# Patient Record
Sex: FEMALE | Race: WHITE | NOT HISPANIC OR LATINO | Employment: FULL TIME | URBAN - METROPOLITAN AREA
[De-identification: names, ages, dates, MRNs, and addresses within clinical notes are randomized per-mention and may not be internally consistent; named-entity substitution may affect disease eponyms.]

---

## 2017-04-02 ENCOUNTER — OFFICE VISIT - HEALTHEAST (OUTPATIENT)
Dept: FAMILY MEDICINE | Facility: CLINIC | Age: 57
End: 2017-04-02

## 2017-04-02 DIAGNOSIS — R68.89 INFLUENZA-LIKE SYMPTOMS: ICD-10-CM

## 2017-04-02 DIAGNOSIS — R05.9 COUGH: ICD-10-CM

## 2017-04-02 DIAGNOSIS — J02.9 SORE THROAT: ICD-10-CM

## 2017-04-02 DIAGNOSIS — J20.9 ACUTE BRONCHITIS: ICD-10-CM

## 2017-04-02 ASSESSMENT — MIFFLIN-ST. JEOR: SCORE: 1175.1

## 2017-04-04 ENCOUNTER — TRANSFERRED RECORDS (OUTPATIENT)
Dept: HEALTH INFORMATION MANAGEMENT | Facility: CLINIC | Age: 57
End: 2017-04-04

## 2017-04-04 ENCOUNTER — RECORDS - HEALTHEAST (OUTPATIENT)
Dept: LAB | Facility: CLINIC | Age: 57
End: 2017-04-04

## 2017-04-04 LAB
CHOLEST SERPL-MCNC: 253 MG/DL
FASTING STATUS PATIENT QL REPORTED: NO
HDLC SERPL-MCNC: 45 MG/DL
LDLC SERPL CALC-MCNC: 163 MG/DL
PAP-ABSTRACT: NORMAL
TRIGL SERPL-MCNC: 225 MG/DL

## 2017-04-11 LAB
BKR LAB AP ABNORMAL BLEEDING: NO
BKR LAB AP BIRTH CONTROL/HORMONES: NORMAL
BKR LAB AP CERVICAL APPEARANCE: NORMAL
BKR LAB AP GYN ADEQUACY: NORMAL
BKR LAB AP GYN INTERPRETATION: NORMAL
BKR LAB AP HPV REFLEX: NORMAL
BKR LAB AP LMP: NORMAL
BKR LAB AP PATIENT STATUS: NORMAL
BKR LAB AP PREVIOUS ABNORMAL: NORMAL
BKR LAB AP PREVIOUS NORMAL: 2013
HIGH RISK?: NO
PATH REPORT.COMMENTS IMP SPEC: NORMAL
RESULT FLAG (HE HISTORICAL CONVERSION): NORMAL

## 2017-09-14 ENCOUNTER — HOSPITAL ENCOUNTER (OUTPATIENT)
Dept: GENERAL RADIOLOGY | Facility: CLINIC | Age: 57
Discharge: HOME | End: 2017-09-14
Attending: INTERNAL MEDICINE | Admitting: INTERNAL MEDICINE

## 2017-09-14 DIAGNOSIS — R76.11 HISTORY OF MANTOUX POSITIVE, TREATMENT STATUS UNKNOWN: ICD-10-CM

## 2017-09-14 PROCEDURE — 40000293 XR CHEST 1 VIEW, EMPLOYEE HEALTH

## 2017-10-16 ENCOUNTER — COMMUNICATION - HEALTHEAST (OUTPATIENT)
Dept: FAMILY MEDICINE | Facility: CLINIC | Age: 57
End: 2017-10-16

## 2017-10-19 ENCOUNTER — OFFICE VISIT - HEALTHEAST (OUTPATIENT)
Dept: FAMILY MEDICINE | Facility: CLINIC | Age: 57
End: 2017-10-19

## 2017-10-19 DIAGNOSIS — H92.09 EAR PAIN: ICD-10-CM

## 2017-10-19 ASSESSMENT — MIFFLIN-ST. JEOR: SCORE: 1141.36

## 2018-03-17 ENCOUNTER — RECORDS - HEALTHEAST (OUTPATIENT)
Dept: ADMINISTRATIVE | Facility: OTHER | Age: 58
End: 2018-03-17

## 2018-04-05 ENCOUNTER — OFFICE VISIT (OUTPATIENT)
Dept: FAMILY MEDICINE | Facility: CLINIC | Age: 58
End: 2018-04-05
Payer: OTHER MISCELLANEOUS

## 2018-04-05 VITALS
DIASTOLIC BLOOD PRESSURE: 84 MMHG | RESPIRATION RATE: 14 BRPM | OXYGEN SATURATION: 98 % | WEIGHT: 140.6 LBS | SYSTOLIC BLOOD PRESSURE: 119 MMHG | HEART RATE: 69 BPM | HEIGHT: 61 IN | BODY MASS INDEX: 26.55 KG/M2 | TEMPERATURE: 97 F

## 2018-04-05 DIAGNOSIS — Z01.818 PREOP GENERAL PHYSICAL EXAM: Primary | ICD-10-CM

## 2018-04-05 DIAGNOSIS — G56.22 ENTRAPMENT OF LEFT ULNAR NERVE: ICD-10-CM

## 2018-04-05 DIAGNOSIS — Z12.31 VISIT FOR SCREENING MAMMOGRAM: ICD-10-CM

## 2018-04-05 LAB
BASOPHILS # BLD AUTO: 0 10E9/L (ref 0–0.2)
BASOPHILS NFR BLD AUTO: 0.6 %
DIFFERENTIAL METHOD BLD: NORMAL
EOSINOPHIL # BLD AUTO: 0.2 10E9/L (ref 0–0.7)
EOSINOPHIL NFR BLD AUTO: 3.1 %
ERYTHROCYTE [DISTWIDTH] IN BLOOD BY AUTOMATED COUNT: 14.1 % (ref 10–15)
HCT VFR BLD AUTO: 43.5 % (ref 35–47)
HGB BLD-MCNC: 14.2 G/DL (ref 11.7–15.7)
LYMPHOCYTES # BLD AUTO: 2.5 10E9/L (ref 0.8–5.3)
LYMPHOCYTES NFR BLD AUTO: 37.9 %
MCH RBC QN AUTO: 28.9 PG (ref 26.5–33)
MCHC RBC AUTO-ENTMCNC: 32.6 G/DL (ref 31.5–36.5)
MCV RBC AUTO: 89 FL (ref 78–100)
MONOCYTES # BLD AUTO: 0.5 10E9/L (ref 0–1.3)
MONOCYTES NFR BLD AUTO: 7.1 %
NEUTROPHILS # BLD AUTO: 3.3 10E9/L (ref 1.6–8.3)
NEUTROPHILS NFR BLD AUTO: 51.3 %
PLATELET # BLD AUTO: 209 10E9/L (ref 150–450)
RBC # BLD AUTO: 4.91 10E12/L (ref 3.8–5.2)
WBC # BLD AUTO: 6.5 10E9/L (ref 4–11)

## 2018-04-05 PROCEDURE — 85025 COMPLETE CBC W/AUTO DIFF WBC: CPT | Performed by: PHYSICIAN ASSISTANT

## 2018-04-05 PROCEDURE — 99214 OFFICE O/P EST MOD 30 MIN: CPT | Performed by: PHYSICIAN ASSISTANT

## 2018-04-05 PROCEDURE — 36415 COLL VENOUS BLD VENIPUNCTURE: CPT | Performed by: PHYSICIAN ASSISTANT

## 2018-04-05 RX ORDER — IBUPROFEN 600 MG/1
TABLET, FILM COATED ORAL
Refills: 0 | COMMUNITY
Start: 2018-01-26 | End: 2022-12-14

## 2018-04-05 NOTE — MR AVS SNAPSHOT
After Visit Summary   4/5/2018    Pretty Parker    MRN: [id number]           Patient Information     Date Of Birth          1960        Visit Information        Provider Department      4/5/2018 12:20 PM Yordy Ramos PA-C [facility] [place]        Today's Diagnoses     Preop general physical exam    -  1    Entrapment of left ulnar nerve        Visit for screening mammogram        Need for prophylactic vaccination with tetanus-diphtheria (TD)          Care Instructions      Before Your Surgery      Call your surgeon if there is any change in your health. This includes signs of a cold or flu (such as a sore throat, runny nose, cough, rash or fever).    Do not smoke, drink alcohol or take over the counter medicine (unless your surgeon or primary care doctor tells you to) for the 24 hours before and after surgery.    If you take prescribed drugs: Follow your doctor s orders about which medicines to take and which to stop until after surgery.    Eating and drinking prior to surgery: follow the instructions from your surgeon    Take a shower or bath the night before surgery. Use the soap your surgeon gave you to gently clean your skin. If you do not have soap from your surgeon, use your regular soap. Do not shave or scrub the surgery site.  Wear clean pajamas and have clean sheets on your bed.           Follow-ups after your visit        Follow-up notes from your care team     Return in about 1 year (around 4/5/2019) for Physical Exam.      Future tests that were ordered for you today     Open Future Orders        Priority Expected Expires Ordered    MA SCREENING DIGITAL BILAT - Future  (s+30) Routine  4/5/2019 4/5/2018            Who to contact     If you have questions or need follow up information about today's clinic visit or your schedule please contact [facility] [place] directly at [phone].  Normal or non-critical lab and imaging results will be communicated  "to you by TapImmunehart, letter or phone within 4 business days after the clinic has received the results. If you do not hear from us within 7 days, please contact the clinic through quietrevolution or phone. If you have a critical or abnormal lab result, we will notify you by phone as soon as possible.  Submit refill requests through quietrevolution or call your pharmacy and they will forward the refill request to us. Please allow 3 business days for your refill to be completed.          Additional Information About Your Visit        quietrevolution Information     quietrevolution lets you send messages to your doctor, view your test results, renew your prescriptions, schedule appointments and more. To sign up, go to [email or web address] . Click on \"Log in\" on the left side of the screen, which will take you to the Welcome page. Then click on \"Sign up Now\" on the right side of the page.     You will be asked to enter the access code listed below, as well as some personal information. Please follow the directions to create your username and password.     Your access code is: [email or web address]  Expires: 2018 12:49 PM     Your access code will  in 90 days. If you need help or a new code, please call your [place] clinic or [phone].        Care EveryWhere ID     This is your Care EveryWhere ID. This could be used by other organizations to access your [place] medical records  [id number]        Your Vitals Were     Pulse Temperature Respirations Height Pulse Oximetry BMI (Body Mass Index)    69 97  F (36.1  C) (Tympanic) 14 5' 0.5\" (1.537 m) 98% 27.01 kg/m2       Blood Pressure from Last 3 Encounters:   18 119/84   12 105/79    Weight from Last 3 Encounters:   18 140 lb 9.6 oz (63.8 kg)              We Performed the Following     CBC with platelets differential        Primary Care Provider Fax #    Provider Not In System [phone]                Equal Access to Services     WHITNEY CURTIS AH: Berry Kwan, " waashleykyle adamjoseha, niru kabony cornelius, tong bensonaadamon ah. So [facility] [phone].    ATENCIÓN: Si lorila nayla, tiene a baugh disposición servicios gratuitos de asistencia lingüística. Llame al [phone].    We comply with applicable federal civil rights laws and [place] laws. We do not discriminate on the basis of race, color, national origin, age, disability, sex, sexual orientation, or gender identity.            Thank you!     [facility]. Our goal is always to provide you with excellent care. Hearing back from our patients is one way we can continue to improve our services. Please take a few minutes to complete the written survey that you may receive in the mail after your visit with us. Thank you!             Your Updated Medication List - Protect others around you: Learn how to safely use, store and throw away your medicines at www.disposemymeds.org.          This list is accurate as of 4/5/18 12:50 PM.  Always use your most recent med list.                   Brand Name Dispense Instructions for use Diagnosis    ibuprofen 600 MG tablet    ADVIL/MOTRIN     TK 1 T PO TID PRN

## 2018-04-05 NOTE — LETTER
April 5, 2018      Pretty Parker  [address] [address]        Dear ,    We are writing to inform you of your test results.      -Normal red blood cell (hgb) levels, normal white blood cell count and normal platelet levels.    Resulted Orders   CBC with platelets differential   Result Value Ref Range    WBC 6.5 4.0 - 11.0 10e9/L    RBC Count 4.91 3.8 - 5.2 10e12/L    Hemoglobin 14.2 11.7 - 15.7 g/dL    Hematocrit 43.5 35.0 - 47.0 %    MCV 89 78 - 100 fl    MCH 28.9 26.5 - 33.0 pg    MCHC 32.6 31.5 - 36.5 g/dL    RDW 14.1 10.0 - 15.0 %    Platelet Count 209 150 - 450 10e9/L    Diff Method Automated Method     % Neutrophils 51.3 %    % Lymphocytes 37.9 %    % Monocytes 7.1 %    % Eosinophils 3.1 %    % Basophils 0.6 %    Absolute Neutrophil 3.3 1.6 - 8.3 10e9/L    Absolute Lymphocytes 2.5 0.8 - 5.3 10e9/L    Absolute Monocytes 0.5 0.0 - 1.3 10e9/L    Absolute Eosinophils 0.2 0.0 - 0.7 10e9/L    Absolute Basophils 0.0 0.0 - 0.2 10e9/L       If you have any questions or concerns, please call the clinic at the number listed above.       Sincerely,          Yordy Ramos PA-C

## 2018-04-05 NOTE — NURSING NOTE
"Chief Complaint   Patient presents with     Pre-Op Exam       Initial /84  Pulse 69  Temp 97  F (36.1  C) (Tympanic)  Resp 14  Ht 5' 0.5\" (1.537 m)  Wt 140 lb 9.6 oz (63.8 kg)  SpO2 98%  BMI 27.01 kg/m2 Estimated body mass index is 27.01 kg/(m^2) as calculated from the following:    Height as of this encounter: 5' 0.5\" (1.537 m).    Weight as of this encounter: 140 lb 9.6 oz (63.8 kg).  Medication Reconciliation: complete    Donna Shafer MA  "

## 2018-04-05 NOTE — Clinical Note
Please abstract the following data from this visit with this patient into the appropriate field in Epic:  Pap smear done on this date: 01/01/2017 (approximately), by this group: [facility], results were normal.   Colonoscopy done on this date: 01/01/2014 (approximately), by this group: [place], [place], results were normal.

## 2018-04-05 NOTE — PROGRESS NOTES
[facility]  [address] [address]  [phone]  Dept: [phone]    PRE-OP EVALUATION:  Today's date: 2018    Pretty Parker (: 1960) presents for pre-operative evaluation assessment as requested by Dr. Ozzie Cortes.  She requires evaluation and anesthesia risk assessment prior to undergoing surgery/procedure for treatment of left elbow debridement and reconstruction, ulnar nerve decompression.     Proposed Surgery/ Procedure: Left elbow medial epicondylar debridement reconstruction  Date of Surgery/ Procedure: 2018  Time of Surgery/ Procedure: TBD - Afternoon  Hospital/Surgical Facility: [facility]  Fax number for surgical facility: [phone], [phone]  Primary Physician: Adelaide Samano  Type of Anesthesia Anticipated: General    Patient has a Health Care Directive or Living Will:  NO    1. NO - Do you have a history of heart attack, stroke, stent, bypass or surgery on an artery in the head, neck, heart or legs?  2. NO - Do you ever have any pain or discomfort in your chest?  3. NO - Do you have a history of  Heart Failure?  4. NO - Are you troubled by shortness of breath when: walking on the level, up a slight hill or at night?  5. NO - Do you currently have a cold, bronchitis or other respiratory infection?  6. NO - Do you have a cough, shortness of breath or wheezing?  7. NO - Do you sometimes get pains in the calves of your legs when you walk?  8. NO - Do you or anyone in your family have previous history of blood clots?  9. NO - Do you or does anyone in your family have a serious bleeding problem such as prolonged bleeding following surgeries or cuts?  10. NO - Have you ever had problems with anemia or been told to take iron pills?  11. NO - Have you had any abnormal blood loss such as black, tarry or bloody stools, or abnormal vaginal bleeding?  12. NO - Have you ever had a blood transfusion?  13. NO - Have  you or any of your relatives ever had problems with anesthesia?  14. NO - Do you have sleep apnea, excessive snoring or daytime drowsiness?  15. NO - Do you have any prosthetic heart valves?  16. NO - Do you have prosthetic joints?  17. NO - Is there any chance that you may be pregnant?      HPI:     HPI related to upcoming procedure: Pretty is new to the clinic today, She presents for a preoperative examination prior to left epicondyl reconstruction and debridement and ulnar decompression for nerve compression. No complaints today      See problem list for active medical problems.  Problems all longstanding and stable, except as noted/documented.  See ROS for pertinent symptoms related to these conditions.                                                                                                                                                          .    MEDICAL HISTORY:     Patient Active Problem List    Diagnosis Date Noted     Depression 08/28/2012     Priority: Medium      History reviewed. No pertinent past medical history.  Past Surgical History:   Procedure Laterality Date     low back surgery       ulnar nerve decompression       Current Outpatient Prescriptions   Medication Sig Dispense Refill     ibuprofen (ADVIL/MOTRIN) 600 MG tablet TK 1 T PO TID PRN  0     OTC products: NSAIDS    Allergies   Allergen Reactions     Aspirin Rash     Penicillins       Latex Allergy: NO    Social History   Substance Use Topics     Smoking status: Never Smoker     Smokeless tobacco: Never Used     Alcohol use Yes      Comment: socially     History   Drug Use No       REVIEW OF SYSTEMS:   CONSTITUTIONAL: NEGATIVE for fever, chills, change in weight  INTEGUMENTARY/SKIN: NEGATIVE for worrisome rashes, moles or lesions  EYES: NEGATIVE for vision changes or irritation  ENT/MOUTH: NEGATIVE for ear, mouth and throat problems  RESP: NEGATIVE for significant cough or SOB  BREAST: NEGATIVE for masses, tenderness or  "discharge  CV: NEGATIVE for chest pain, palpitations or peripheral edema  GI: NEGATIVE for nausea, abdominal pain, heartburn, or change in bowel habits  MUSCULOSKELETAL: NEGATIVE for significant arthralgias or myalgia  NEURO: NEGATIVE for weakness, dizziness or paresthesias  ENDOCRINE: NEGATIVE for temperature intolerance, skin/hair changes  HEME: NEGATIVE for bleeding problems  PSYCHIATRIC: NEGATIVE for changes in mood or affect    EXAM:   /84  Pulse 69  Temp 97  F (36.1  C) (Tympanic)  Resp 14  Ht 5' 0.5\" (1.537 m)  Wt 140 lb 9.6 oz (63.8 kg)  SpO2 98%  BMI 27.01 kg/m2    GENERAL APPEARANCE: healthy, alert and no distress     EYES: EOMI, PERRL     HENT: ear canals and TM's normal and nose and mouth without ulcers or lesions     NECK: no adenopathy, no asymmetry, masses, or scars and thyroid normal to palpation     RESP: lungs clear to auscultation - no rales, rhonchi or wheezes     CV: regular rates and rhythm, normal S1 S2, no S3 or S4 and no murmur, click or rub     ABDOMEN:  soft, nontender, no HSM or masses and bowel sounds normal     MS: extremities normal- no gross deformities noted, no evidence of inflammation in joints, FROM in all extremities.     SKIN: no suspicious lesions or rashes     NEURO: Normal strength and tone, sensory exam grossly normal, mentation intact and speech normal     PSYCH: mentation appears normal. and affect normal/bright    DIAGNOSTICS:   Hemoglobin (indicated for history of anemia or procedure with significant blood loss such as tonsillectomy, major intraperitoneal surgery, vascular surgery, major spine surgery, total joint replacement)    Recent Labs   Lab Test  08/29/12   0734  08/28/12   0300   HGB  13.8   --    PLT  190   --    NA   --   141   POTASSIUM   --   3.7   CR   --   0.48*      Results for orders placed or performed in visit on 04/05/18   CBC with platelets differential   Result Value Ref Range    WBC 6.5 4.0 - 11.0 10e9/L    RBC Count 4.91 3.8 - 5.2 " 10e12/L    Hemoglobin 14.2 11.7 - 15.7 g/dL    Hematocrit 43.5 35.0 - 47.0 %    MCV 89 78 - 100 fl    MCH 28.9 26.5 - 33.0 pg    MCHC 32.6 31.5 - 36.5 g/dL    RDW 14.1 10.0 - 15.0 %    Platelet Count 209 150 - 450 10e9/L    Diff Method Automated Method     % Neutrophils 51.3 %    % Lymphocytes 37.9 %    % Monocytes 7.1 %    % Eosinophils 3.1 %    % Basophils 0.6 %    Absolute Neutrophil 3.3 1.6 - 8.3 10e9/L    Absolute Lymphocytes 2.5 0.8 - 5.3 10e9/L    Absolute Monocytes 0.5 0.0 - 1.3 10e9/L    Absolute Eosinophils 0.2 0.0 - 0.7 10e9/L    Absolute Basophils 0.0 0.0 - 0.2 10e9/L       EKG not indicated today  IMPRESSION:   Reason for surgery/procedure: left elbow reconstruction and ulnar nerve decompression  Diagnosis/reason for consult: preoperative examination    The proposed surgical procedure is considered INTERMEDIATE risk.    REVISED CARDIAC RISK INDEX  The patient has the following serious cardiovascular risks for perioperative complications such as (MI, PE, VFib and 3  AV Block):  No serious cardiac risks  INTERPRETATION: 0 risks: Class I (very low risk - 0.4% complication rate)    The patient has the following additional risks for perioperative complications:  No identified additional risks    1. Preop general physical exam  - CBC with platelets differential    2. Entrapment of left ulnar nerve    3. Visit for screening mammogram  - MA SCREENING DIGITAL BILAT - Future  (s+30); Future        RECOMMENDATIONS:             APPROVAL GIVEN to proceed with proposed procedure, without further diagnostic evaluation       Signed Electronically by: Yordy Ramos PA-C    Copy of this evaluation report is provided to requesting physician.    [facility] Preop Guidelines    Revised Cardiac Risk Index    Addendum:  I have reviewed the above document, agree with the assessment and plan as outlined.  Optimized for the planned procedure  Apolinar Connolly MD  4/9/2018

## 2018-12-24 ENCOUNTER — OFFICE VISIT (OUTPATIENT)
Dept: FAMILY MEDICINE | Facility: CLINIC | Age: 58
End: 2018-12-24
Payer: COMMERCIAL

## 2018-12-24 VITALS
HEIGHT: 61 IN | WEIGHT: 138 LBS | TEMPERATURE: 97.2 F | BODY MASS INDEX: 26.06 KG/M2 | RESPIRATION RATE: 14 BRPM | HEART RATE: 76 BPM | SYSTOLIC BLOOD PRESSURE: 129 MMHG | OXYGEN SATURATION: 98 % | DIASTOLIC BLOOD PRESSURE: 76 MMHG

## 2018-12-24 DIAGNOSIS — R13.10 DYSPHAGIA, UNSPECIFIED TYPE: Primary | ICD-10-CM

## 2018-12-24 PROCEDURE — 99213 OFFICE O/P EST LOW 20 MIN: CPT | Performed by: PHYSICIAN ASSISTANT

## 2018-12-24 ASSESSMENT — MIFFLIN-ST. JEOR: SCORE: 1145.4

## 2018-12-24 NOTE — PROGRESS NOTES
"  SUBJECTIVE:   Pretty Parker is a 58 year old female who presents to clinic today for the following health issues:      Throat Problem       Duration: 4 years on and off     Description (location/character/radiation): difficulty swallowing     Intensity:  moderate    Accompanying signs and symptoms: foul smell when taking deep breaths     History (similar episodes/previous evaluation): Endoscopy, diagnosed with GERD    Precipitating or alleviating factors: None    Therapies tried and outcome: Silvia Olsen presents to the clinic concerned about a 4 year hx of difficulty swallowing.  She describes this as feeling like something is \" getting stuck\" in her throat when she swallows.  This seems to happen with liquids and solids.She is not having pain when she swallows.  She had an endoscopy in the past and was diagnosed with GERD but feels like these symptoms are different.     Problem list and histories reviewed & adjusted, as indicated.  Additional history: as documented    Patient Active Problem List   Diagnosis     Depression     Past Surgical History:   Procedure Laterality Date     low back surgery       ulnar nerve decompression         Social History     Tobacco Use     Smoking status: Never Smoker     Smokeless tobacco: Never Used   Substance Use Topics     Alcohol use: Yes     Comment: socially     Family History   Problem Relation Age of Onset     Coronary Artery Disease Maternal Grandmother 69         Current Outpatient Medications   Medication Sig Dispense Refill     ibuprofen (ADVIL/MOTRIN) 600 MG tablet TK 1 T PO TID PRN  0     ranitidine (ZANTAC) 75 MG tablet Take 150 mg by mouth daily       UNABLE TO FIND daily MEDICATION NAME: Cold- eeze       UNABLE TO FIND daily MEDICATION NAME: Sever cold & flu       Allergies   Allergen Reactions     Aspirin Rash     Penicillins        Reviewed and updated as needed this visit by clinical staff       Reviewed and updated as needed this visit by Provider     " "    ROS:  Constitutional, HEENT, cardiovascular, pulmonary, gi and gu systems are negative, except as otherwise noted.    OBJECTIVE:     /76   Pulse 76   Temp 97.2  F (36.2  C) (Tympanic)   Resp 14   Ht 1.537 m (5' 0.5\")   Wt 62.6 kg (138 lb)   SpO2 98%   BMI 26.51 kg/m    Body mass index is 26.51 kg/m .  GENERAL: healthy, alert and no distress  HENT: ear canals and TM's normal, nose and mouth without ulcers or lesions  NECK: no adenopathy, no asymmetry, masses, or scars and thyroid normal to palpation  RESP: lungs clear to auscultation - no rales, rhonchi or wheezes  CV: regular rate and rhythm, normal S1 S2, no S3 or S4, no murmur, click or rub, no peripheral edema and peripheral pulses strong  ABDOMEN: soft, nontender, no hepatosplenomegaly, no masses and bowel sounds normal    Diagnostic Test Results:  none     ASSESSMENT/PLAN:       1. Dysphagia, unspecified type  Worsening dysphagia over the past 4 years, will start with endoscopy for further assessment.  If normal, may suggest ENT referral  - GASTROENTEROLOGY ADULT REF PROCEDURE ONLY Efren Meza [phone]; No Provider Preference        See Patient Instructions    Yordy Ramos PA-C  [facility]    "

## 2019-01-24 ENCOUNTER — HOSPITAL ENCOUNTER (OUTPATIENT)
Facility: CLINIC | Age: 59
Discharge: HOME | End: 2019-01-24
Attending: SPECIALIST | Admitting: SPECIALIST
Payer: COMMERCIAL

## 2019-01-24 ENCOUNTER — TRANSFERRED RECORDS (OUTPATIENT)
Dept: HEALTH INFORMATION MANAGEMENT | Facility: CLINIC | Age: 59
End: 2019-01-24

## 2019-01-24 VITALS
OXYGEN SATURATION: 98 % | DIASTOLIC BLOOD PRESSURE: 87 MMHG | HEIGHT: 61 IN | RESPIRATION RATE: 23 BRPM | SYSTOLIC BLOOD PRESSURE: 150 MMHG | WEIGHT: 138 LBS | BODY MASS INDEX: 26.06 KG/M2 | HEART RATE: 107 BPM

## 2019-01-24 DIAGNOSIS — K21.00 GASTROESOPHAGEAL REFLUX DISEASE WITH ESOPHAGITIS: Primary | ICD-10-CM

## 2019-01-24 DIAGNOSIS — R13.12 OROPHARYNGEAL DYSPHAGIA: ICD-10-CM

## 2019-01-24 LAB — UPPER GI ENDOSCOPY: NORMAL

## 2019-01-24 PROCEDURE — 25000125 ZZHC RX 250: Performed by: SPECIALIST

## 2019-01-24 PROCEDURE — 88305 TISSUE EXAM BY PATHOLOGIST: CPT | Mod: 26 | Performed by: SPECIALIST

## 2019-01-24 PROCEDURE — 43248 EGD GUIDE WIRE INSERTION: CPT | Performed by: SPECIALIST

## 2019-01-24 PROCEDURE — 25000128 H RX IP 250 OP 636: Performed by: SPECIALIST

## 2019-01-24 PROCEDURE — 88342 IMHCHEM/IMCYTCHM 1ST ANTB: CPT | Performed by: SPECIALIST

## 2019-01-24 PROCEDURE — G0500 MOD SEDAT ENDO SERVICE >5YRS: HCPCS | Performed by: SPECIALIST

## 2019-01-24 PROCEDURE — 43239 EGD BIOPSY SINGLE/MULTIPLE: CPT | Performed by: SPECIALIST

## 2019-01-24 PROCEDURE — 88305 TISSUE EXAM BY PATHOLOGIST: CPT | Performed by: SPECIALIST

## 2019-01-24 PROCEDURE — 88342 IMHCHEM/IMCYTCHM 1ST ANTB: CPT | Mod: 26 | Performed by: SPECIALIST

## 2019-01-24 RX ORDER — FENTANYL CITRATE 50 UG/ML
INJECTION, SOLUTION INTRAMUSCULAR; INTRAVENOUS PRN
Status: DISCONTINUED | OUTPATIENT
Start: 2019-01-24 | End: 2019-01-24 | Stop reason: HOSPADM

## 2019-01-24 RX ORDER — ONDANSETRON 2 MG/ML
4 INJECTION INTRAMUSCULAR; INTRAVENOUS
Status: DISCONTINUED | OUTPATIENT
Start: 2019-01-24 | End: 2019-01-24 | Stop reason: HOSPADM

## 2019-01-24 RX ORDER — LIDOCAINE 40 MG/G
CREAM TOPICAL
Status: DISCONTINUED | OUTPATIENT
Start: 2019-01-24 | End: 2019-01-24 | Stop reason: HOSPADM

## 2019-01-24 ASSESSMENT — MIFFLIN-ST. JEOR: SCORE: 1157.3

## 2019-01-24 NOTE — H&P
Pre-Endoscopy History and Physical     Pretty Parker MRN# [id number]   YOB: 1960 Age: 58 year old     Date of Procedure: 1/24/2019  Primary care provider: No Ref-Primary, Physician  Type of Endoscopy: Gastroscopy with possible biopsy, possible dilation  Reason for Procedure: dysphagia  Type of Anesthesia Anticipated: Conscious Sedation    HPI:    Pretty is a 58 year old female who will be undergoing the above procedure.      A history and physical has been performed. The patient's medications and allergies have been reviewed. The risks and benefits of the procedure and the sedation options and risks were discussed with the patient.  All questions were answered and informed consent was obtained.      She denies a personal or family history of anesthesia complications or bleeding disorders.     Patient Active Problem List   Diagnosis     Depression        History reviewed. No pertinent past medical history.     Past Surgical History:   Procedure Laterality Date     APPENDECTOMY       low back surgery       ulnar nerve decompression         Social History     Tobacco Use     Smoking status: Never Smoker     Smokeless tobacco: Never Used   Substance Use Topics     Alcohol use: Yes     Comment: socially       Family History   Problem Relation Age of Onset     Coronary Artery Disease Maternal Grandmother 69       Prior to Admission medications    Medication Sig Start Date End Date Taking? Authorizing Provider   ibuprofen (ADVIL/MOTRIN) 600 MG tablet TK 1 T PO TID PRN 1/26/18  Yes Reported, Patient   ranitidine (ZANTAC) 75 MG tablet Take 150 mg by mouth daily   Yes Reported, Patient   UNABLE TO FIND daily MEDICATION NAME: Cold- eeze    Reported, Patient   UNABLE TO FIND daily MEDICATION NAME: Sever cold & flu    Reported, Patient       Allergies   Allergen Reactions     Aspirin Rash     Penicillins      Sulfa Drugs Rash        REVIEW OF SYSTEMS:   5 point ROS negative except as noted above in HPI, including  "Gen., Resp., CV, GI &  system review.    PHYSICAL EXAM:   /80   Pulse 75   Ht 1.556 m (5' 1.25\")   Wt 62.6 kg (138 lb)   SpO2 96%   BMI 25.86 kg/m   Estimated body mass index is 25.86 kg/m  as calculated from the following:    Height as of this encounter: 1.556 m (5' 1.25\").    Weight as of this encounter: 62.6 kg (138 lb).   GENERAL APPEARANCE: alert, and oriented  MENTAL STATUS: alert  AIRWAY EXAM: Mallampatti Class I (visualization of the soft palate, fauces, uvula, anterior and posterior pillars)  RESP: lungs clear to auscultation - no rales, rhonchi or wheezes  CV: regular rates and rhythm  DIAGNOSTICS:    Not indicated    IMPRESSION   ASA Class 1 - Healthy patient, no medical problems    PLAN:   Plan for Gastroscopy with possible biopsy, possible dilation. We discussed the risks, benefits and alternatives and the patient wished to proceed.    The above has been forwarded to the consulting provider.      Signed Electronically by: Manolo Solares  January 24, 2019          "

## 2019-01-31 LAB — COPATH REPORT: NORMAL

## 2019-03-17 ENCOUNTER — OFFICE VISIT (OUTPATIENT)
Dept: URGENT CARE | Facility: URGENT CARE | Age: 59
End: 2019-03-17
Payer: COMMERCIAL

## 2019-03-17 VITALS
OXYGEN SATURATION: 96 % | HEART RATE: 102 BPM | DIASTOLIC BLOOD PRESSURE: 80 MMHG | WEIGHT: 142.9 LBS | BODY MASS INDEX: 26.78 KG/M2 | SYSTOLIC BLOOD PRESSURE: 118 MMHG | TEMPERATURE: 99 F | RESPIRATION RATE: 16 BRPM

## 2019-03-17 DIAGNOSIS — R05.9 COUGH: Primary | ICD-10-CM

## 2019-03-17 DIAGNOSIS — R09.89 CHEST CONGESTION: ICD-10-CM

## 2019-03-17 DIAGNOSIS — R09.81 NASAL CONGESTION: ICD-10-CM

## 2019-03-17 PROCEDURE — 99214 OFFICE O/P EST MOD 30 MIN: CPT | Performed by: PHYSICIAN ASSISTANT

## 2019-03-17 RX ORDER — AZITHROMYCIN 250 MG/1
TABLET, FILM COATED ORAL
Qty: 6 TABLET | Refills: 0 | Status: SHIPPED | OUTPATIENT
Start: 2019-03-17 | End: 2022-12-14

## 2019-03-17 RX ORDER — CODEINE PHOSPHATE AND GUAIFENESIN 10; 100 MG/5ML; MG/5ML
5-10 SOLUTION ORAL
Qty: 120 ML | Refills: 0 | Status: SHIPPED | OUTPATIENT
Start: 2019-03-17 | End: 2022-12-14

## 2019-03-17 RX ORDER — BENZONATATE 200 MG/1
200 CAPSULE ORAL 3 TIMES DAILY PRN
Qty: 21 CAPSULE | Refills: 0 | Status: SHIPPED | OUTPATIENT
Start: 2019-03-17 | End: 2019-03-24

## 2019-03-17 NOTE — PROGRESS NOTES
SUBJECTIVE:   Pretty Parker is a 58 year old female presenting with a chief complaint of coughing, congestion.  Onset of symptoms was 3 day(s) ago.  Course of illness is same.    Severity mild  Current and Associated symptoms: stuffy nose and cough - non-productive  Treatment measures tried include Fluids and Rest.  Predisposing factors include none.    PMH  Dysphagia       Allergies   Allergen Reactions     Aspirin Rash     Penicillins      Sulfa Drugs Rash     Family Hx  HTN  Anxiety  Depression    Social History     Tobacco Use     Smoking status: Never Smoker     Smokeless tobacco: Never Used   Substance Use Topics     Alcohol use: Yes     Comment: socially       ROS:  CONSTITUTIONAL:NEGATIVE for fever, chills, change in weight  INTEGUMENTARY/SKIN: NEGATIVE for worrisome rashes, moles or lesions  EYES: NEGATIVE for vision changes or irritation  ENT/MOUTH: POSITIVE for nasal congestion  RESP:POSITIVE for cough-non productive  CV: NEGATIVE for chest pain, palpitations or peripheral edema  GI: NEGATIVE for nausea, abdominal pain, heartburn, or change in bowel habits  : negative for and dysuria  MUSCULOSKELETAL: NEGATIVE for significant arthralgias or myalgia  NEURO: NEGATIVE for weakness, dizziness or paresthesias    OBJECTIVE  :/80   Pulse 102   Temp 99  F (37.2  C) (Oral)   Resp 16   Wt 64.8 kg (142 lb 14.4 oz)   SpO2 96%   BMI 26.78 kg/m    GENERAL APPEARANCE: healthy, alert and no distress  EYES: EOMI,  PERRL, conjunctiva clear  HENT: ear canals and TM's normal.  Nose and mouth without ulcers, erythema or lesions  NECK: supple, nontender, no lymphadenopathy  RESP: lungs clear to auscultation - no rales, rhonchi or wheezes  CV: regular rates and rhythm, normal S1 S2, no murmur noted  NEURO: Normal strength and tone, sensory exam grossly normal,  normal speech and mentation  SKIN: no suspicious lesions or rashes    ASSESSMENT/PLAN    ICD-10-CM    1. Cough R05 benzonatate (TESSALON) 200 MG capsule      guaiFENesin-codeine (ROBITUSSIN AC) 100-10 MG/5ML solution   2. Chest congestion R09.89 benzonatate (TESSALON) 200 MG capsule     azithromycin (ZITHROMAX) 250 MG tablet   3. Nasal congestion R09.81 azithromycin (ZITHROMAX) 250 MG tablet       Orders Placed This Encounter     benzonatate (TESSALON) 200 MG capsule     guaiFENesin-codeine (ROBITUSSIN AC) 100-10 MG/5ML solution     azithromycin (ZITHROMAX) 250 MG tablet       Patient works in pharmacy  zpak for if symptoms of coughing, congestion worsen  Fluids, rest  Follow up with PCP as needed  See orders in Epic

## 2019-03-17 NOTE — LETTER
[facility]  [address] [address]  [phone]      March 17, 2019    RE:  Pretty Parker                                                                                                                                                       [address] [facility] [address]            To whom it may concern:    Pretty Parker was seen in the urgent care today for a cold.  She may return to work.        Sincerely,        [facility]

## 2019-04-29 ENCOUNTER — OFFICE VISIT (OUTPATIENT)
Dept: FAMILY MEDICINE | Facility: CLINIC | Age: 59
End: 2019-04-29
Payer: COMMERCIAL

## 2019-04-29 VITALS — SYSTOLIC BLOOD PRESSURE: 114 MMHG | DIASTOLIC BLOOD PRESSURE: 74 MMHG

## 2019-04-29 DIAGNOSIS — L72.0 EPIDERMOID CYST OF SKIN OF SHOULDER: Primary | ICD-10-CM

## 2019-04-29 PROCEDURE — 99213 OFFICE O/P EST LOW 20 MIN: CPT | Performed by: FAMILY MEDICINE

## 2019-04-29 NOTE — LETTER
4/29/2019         RE: Pretty Parker  [address] [facility] [address]        Dear Colleague,    Thank you for referring your patient, Pretty Parker, to the [facility]. Please see a copy of my visit note below.    [facility] - PRIMARY CARE SKIN    CC: cyst   SUBJECTIVE:   Pretty Parker is a(n) 58 year old female who presents to clinic today because of a cyst on the left posterior shoulder.    Enlarging: No.  Tenderness: Yes.  Drainage: No.    Personal Medical History  Skin Cancer: NO  Eczema Psoriasis Autoimmune   NO NO NO     Family Medical History  Skin Cancer: NO  Eczema Psoriasis Autoimmune   NO NO NO     Occupation: pharmacy tech (indoor).    Refer to electronic medical record (EMR) for past medical history and medications.    INTEGUMENTARY/SKIN: POSITIVE for lumps or bumps  ROS: 14 point review of systems was negative except the symptoms listed above in the HPI.    This document serves as a record of the services and decisions personally performed and made by Franny North MD and was created by Chevy Mcdonald, a trained medical scribe, based on personal observations and provider statements to the medical scribe.  April 29, 2019 2:56 PM   Chevy Mcdonald    OBJECTIVE:   GENERAL: healthy, alert and no distress.  SKIN: Jimenez Skin Type - III.  Trunk, Arms were examined. The dermatoscope was used to help evaluate pigmented lesions.  Skin Pertinent Findings:  Left posterior shoulder: 6 mm in size freely mobile subepidermal mass most consistent with epidermoid cyst.    ASSESSMENT:     Encounter Diagnosis   Name Primary?     Epidermoid cyst of skin of shoulder Yes     MDM:   Discussion regarding treatment options for epidermoid cysts, including observation and excision.   Discussed risks of the excision procedure, including infection and scarring. It was explained that the cyst can reoccur, especially if it has become inflamed or infected prior to removal.    PLAN:   Patient  Instructions   FUTURE APPOINTMENTS  Follow up for a 40 minute appointment for excision of the cyst.    Avoid poking at, popping, or otherwise trying to manipulate the cyst.  Cyst excision would require activity modification for 2 weeks after the procedure.    TT: 20 minutes  CT: 15 minutes    The information in this document, created by the medical scribe for me, accurately reflects the services I personally performed and the decisions made by me. I have reviewed and approved this document for accuracy prior to leaving the patient care area.  April 29, 2019 2:56 PM  Franny North MD  [facility]    Again, thank you for allowing me to participate in the care of your patient.        Sincerely,        Franny North MD

## 2019-04-29 NOTE — PROGRESS NOTES
[facility] - PRIMARY CARE SKIN    CC: cyst   SUBJECTIVE:   Pretty Parker is a(n) 58 year old female who presents to clinic today because of a cyst on the left posterior shoulder.    Enlarging: No.  Tenderness: Yes.  Drainage: No.    Personal Medical History  Skin Cancer: NO  Eczema Psoriasis Autoimmune   NO NO NO     Family Medical History  Skin Cancer: NO  Eczema Psoriasis Autoimmune   NO NO NO     Occupation: pharmacy tech (indoor).    Refer to electronic medical record (EMR) for past medical history and medications.    INTEGUMENTARY/SKIN: POSITIVE for lumps or bumps  ROS: 14 point review of systems was negative except the symptoms listed above in the HPI.    This document serves as a record of the services and decisions personally performed and made by Franny North MD and was created by Chevy Mcdonald, a trained medical scribe, based on personal observations and provider statements to the medical scribe.  April 29, 2019 2:56 PM   Chevy Mcdonald    OBJECTIVE:   GENERAL: healthy, alert and no distress.  SKIN: Jimenez Skin Type - III.  Trunk, Arms were examined. The dermatoscope was used to help evaluate pigmented lesions.  Skin Pertinent Findings:  Left posterior shoulder: 6 mm in size freely mobile subepidermal mass most consistent with epidermoid cyst.    ASSESSMENT:     Encounter Diagnosis   Name Primary?     Epidermoid cyst of skin of shoulder Yes     MDM:   Discussion regarding treatment options for epidermoid cysts, including observation and excision.   Discussed risks of the excision procedure, including infection and scarring. It was explained that the cyst can reoccur, especially if it has become inflamed or infected prior to removal.    PLAN:   Patient Instructions   FUTURE APPOINTMENTS  Follow up for a 40 minute appointment for excision of the cyst.    Avoid poking at, popping, or otherwise trying to manipulate the cyst.  Cyst excision would require activity modification for 2 weeks after the  procedure.    TT: 20 minutes  CT: 15 minutes    The information in this document, created by the medical scribe for me, accurately reflects the services I personally performed and the decisions made by me. I have reviewed and approved this document for accuracy prior to leaving the patient care area.  April 29, 2019 2:56 PM  Franny North MD  [facility]

## 2019-04-29 NOTE — PATIENT INSTRUCTIONS
FUTURE APPOINTMENTS  Follow up for a 40 minute appointment for excision of the cyst.    Avoid poking at, popping, or otherwise trying to manipulate the cyst.  Cyst excision would require activity modification for 2 weeks after the procedure.

## 2019-05-09 ENCOUNTER — OFFICE VISIT (OUTPATIENT)
Dept: FAMILY MEDICINE | Facility: CLINIC | Age: 59
End: 2019-05-09
Payer: COMMERCIAL

## 2019-05-09 VITALS — SYSTOLIC BLOOD PRESSURE: 114 MMHG | DIASTOLIC BLOOD PRESSURE: 72 MMHG

## 2019-05-09 DIAGNOSIS — L72.0 EPIDERMOID CYST OF SKIN OF SHOULDER: Primary | ICD-10-CM

## 2019-05-09 PROCEDURE — 11401 EXC TR-EXT B9+MARG 0.6-1 CM: CPT | Mod: 51 | Performed by: FAMILY MEDICINE

## 2019-05-09 PROCEDURE — 12032 INTMD RPR S/A/T/EXT 2.6-7.5: CPT | Performed by: FAMILY MEDICINE

## 2019-05-09 PROCEDURE — 88305 TISSUE EXAM BY PATHOLOGIST: CPT | Mod: TC | Performed by: FAMILY MEDICINE

## 2019-05-09 NOTE — PATIENT INSTRUCTIONS
"FUTURE APPOINTMENTS  Follow up in 7 days for bandage change with the nurse.    If any problems with wound healing:  call directly back to clinic RN at [phone]    BANDAGE CARE INSTRUCTIONS    Keep dressing completely dry.    Make sure to avoid soaking the procedure area. Cover with Tegaderm or plastic wrap when showering.    Limit use of the area where the procedure was done for a few days to allow for optimal healing.    If you experience bleeding:  Wash hands and hold firm pressure on the area for 10 minutes without checking to see if the bleeding has stopped. \"Checking\" pulls off the protective wound clot and restarts the bleeding all over again. Re-apply pressure for 10 minutes if necessary to stop bleeding.  Use additional sterile gauze and tape to maintain pressure once bleeding has stopped.  If bleeding continues, then call back to clinic at [phone].    Signs of Infection:  Infection can occur in any area where skin has been disrupted.  If you notice persistent redness, swelling, colored drainage, increasing pain, fever or other signs of infection, please call us at: [phone] and ask to have me or my colleague paged. We will call you back to discuss.    Pathology Results:  You will be notified, generally via letter or MyChart, in approximately 10 days. If there is anything we need to discuss or further treatment needed, I will call you to discuss it.    PAIN CONTROL INSTRUCTIONS    First, try either acetaminophen (Tylenol), or NSAID ibuprofen (Advil, Markus, etc), or NSAID naproxen (Aleve, Naprosyn)    Acetaminophen dosage : one 325-500 mg tablet taken every 4-6 hours with a maximum of 4000 mg/day = 4 g/day    Ibuprofen dosage : one 600 mg tablet taken every 4-6 hours with a maximum of 4-6 tablets/day    Naproxen dosage : one 500 mg tablet taken twice daily with a maximum of 2 tablets/day     Second, try combining acetaminophen and an NSAID - often the combination will work better then " either one alone.

## 2019-05-09 NOTE — LETTER
5/9/2019         RE: Pretty Parker  [address] [facility] [address]        Dear Colleague,    Thank you for referring your patient, Pretty Parker, to the [facility]. Please see a copy of my visit note below.    [facility] - PRIMARY CARE SKIN    CC: cyst   SUBJECTIVE:   Pretty Parker is a(n) 58 year old female who presents to clinic today for excision of a cyst on the left posterior shoulder.    Enlarging: No. It has diminished in size, but it will enlarge if she touches it.  Tenderness: Yes.  Drainage: No.    Personal Medical History  Skin Cancer: NO  Eczema Psoriasis Autoimmune   NO NO NO     Family Medical History  Skin Cancer: NO  Eczema Psoriasis Autoimmune   NO NO NO     Occupation: pharmacy tech (indoor).    Refer to electronic medical record (EMR) for past medical history and medications.    INTEGUMENTARY/SKIN: POSITIVE for lumps or bumps  ROS: 14 point review of systems was negative except the symptoms listed above in the HPI.    This document serves as a record of the services and decisions personally performed and made by Franny North MD and was created by Chevy Mcdonald, a trained medical scribe, based on personal observations and provider statements to the medical scribe.  May 9, 2019 7:00 AM   Chevy Mcdonald    OBJECTIVE:   GENERAL: healthy, alert and no distress.  SKIN: Jimenez Skin Type - III.  Trunk, Arms were examined. The dermatoscope was used to help evaluate pigmented lesions.  Skin Pertinent Findings:  Left posterior shoulder: 6 mm in size freely mobile subepidermal mass most consistent with epidermoid cyst.    Left lower posterior shoulder- 4 mm erythematous subepidermal mass with overlying violaceous hue. This was not the original cyst she came in with. I contacted her and explained this is the one that was removed. The cyst higher up can be addressed next week, since that one has been bothering her    ASSESSMENT:     Encounter Diagnosis   Name  "Primary?     Epidermoid cyst of skin of shoulder Yes     MDM:   Discussion regarding treatment options for epidermoid cysts, including observation and excision.   Discussed risks of the excision procedure, including infection and scarring. It was explained that the cyst can reoccur, especially if it has become inflamed or infected prior to removal.    PLAN:   Patient Instructions   FUTURE APPOINTMENTS  Follow up in 7 days for bandage change with the nurse.    If any problems with wound healing:  call directly back to clinic RN at [phone]    BANDAGE CARE INSTRUCTIONS    Keep dressing completely dry.    Make sure to avoid soaking the procedure area. Cover with Tegaderm or plastic wrap when showering.    Limit use of the area where the procedure was done for a few days to allow for optimal healing.    If you experience bleeding:  Wash hands and hold firm pressure on the area for 10 minutes without checking to see if the bleeding has stopped. \"Checking\" pulls off the protective wound clot and restarts the bleeding all over again. Re-apply pressure for 10 minutes if necessary to stop bleeding.  Use additional sterile gauze and tape to maintain pressure once bleeding has stopped.  If bleeding continues, then call back to clinic at [phone].    Signs of Infection:  Infection can occur in any area where skin has been disrupted.  If you notice persistent redness, swelling, colored drainage, increasing pain, fever or other signs of infection, please call us at: [phone] and ask to have me or my colleague paged. We will call you back to discuss.    Pathology Results:  You will be notified, generally via letter or MyChart, in approximately 10 days. If there is anything we need to discuss or further treatment needed, I will call you to discuss it.    PAIN CONTROL INSTRUCTIONS    First, try either acetaminophen (Tylenol), or NSAID ibuprofen (Advil, Markus, etc), or NSAID naproxen (Aleve, " Naprosyn)    Acetaminophen dosage : one 325-500 mg tablet taken every 4-6 hours with a maximum of 4000 mg/day = 4 g/day    Ibuprofen dosage : one 600 mg tablet taken every 4-6 hours with a maximum of 4-6 tablets/day    Naproxen dosage : one 500 mg tablet taken twice daily with a maximum of 2 tablets/day     Second, try combining acetaminophen and an NSAID - often the combination will work better then either one alone.      TT: 20 minutes  CT: 15 minutes    The information in this document, created by the medical scribe for me, accurately reflects the services I personally performed and the decisions made by me. I have reviewed and approved this document for accuracy prior to leaving the patient care area.  May 9, 2019 7:00 AM  Franny North MD  [facility]    Again, thank you for allowing me to participate in the care of your patient.        Sincerely,        Franny North MD

## 2019-05-09 NOTE — PROGRESS NOTES
[facility] - PRIMARY CARE SKIN    CC: cyst   SUBJECTIVE:   Pretty Parker is a(n) 58 year old female who presents to clinic today for excision of a cyst on the left posterior shoulder.    Enlarging: No. It has diminished in size, but it will enlarge if she touches it.  Tenderness: Yes.  Drainage: No.    Personal Medical History  Skin Cancer: NO  Eczema Psoriasis Autoimmune   NO NO NO     Family Medical History  Skin Cancer: NO  Eczema Psoriasis Autoimmune   NO NO NO     Occupation: pharmacy tech (indoor).    Refer to electronic medical record (EMR) for past medical history and medications.    INTEGUMENTARY/SKIN: POSITIVE for lumps or bumps  ROS: 14 point review of systems was negative except the symptoms listed above in the HPI.    This document serves as a record of the services and decisions personally performed and made by Franny North MD and was created by Chevy Mcdonald, a trained medical scribe, based on personal observations and provider statements to the medical scribe.  May 9, 2019 7:00 AM   Chevy Mcdonald    OBJECTIVE:   GENERAL: healthy, alert and no distress.  SKIN: Jimenez Skin Type - III.  Trunk, Arms were examined. The dermatoscope was used to help evaluate pigmented lesions.  Skin Pertinent Findings:  Left posterior shoulder: 6 mm in size freely mobile subepidermal mass most consistent with epidermoid cyst.    Left lower posterior shoulder- 4 mm erythematous subepidermal mass with overlying violaceous hue. This was not the original cyst she came in with. I contacted her and explained this is the one that was removed. The cyst higher up can be addressed next week, since that one has been bothering her    ASSESSMENT:     Encounter Diagnosis   Name Primary?     Epidermoid cyst of skin of shoulder Yes     MDM:   Discussion regarding treatment options for epidermoid cysts, including observation and excision.   Discussed risks of the excision procedure, including infection and scarring. It was  "explained that the cyst can reoccur, especially if it has become inflamed or infected prior to removal.    PLAN:   Patient Instructions   FUTURE APPOINTMENTS  Follow up in 7 days for bandage change with the nurse.    If any problems with wound healing:  call directly back to clinic RN at [phone]    BANDAGE CARE INSTRUCTIONS    Keep dressing completely dry.    Make sure to avoid soaking the procedure area. Cover with Tegaderm or plastic wrap when showering.    Limit use of the area where the procedure was done for a few days to allow for optimal healing.    If you experience bleeding:  Wash hands and hold firm pressure on the area for 10 minutes without checking to see if the bleeding has stopped. \"Checking\" pulls off the protective wound clot and restarts the bleeding all over again. Re-apply pressure for 10 minutes if necessary to stop bleeding.  Use additional sterile gauze and tape to maintain pressure once bleeding has stopped.  If bleeding continues, then call back to clinic at [phone].    Signs of Infection:  Infection can occur in any area where skin has been disrupted.  If you notice persistent redness, swelling, colored drainage, increasing pain, fever or other signs of infection, please call us at: [phone] and ask to have me or my colleague paged. We will call you back to discuss.    Pathology Results:  You will be notified, generally via letter or MyChart, in approximately 10 days. If there is anything we need to discuss or further treatment needed, I will call you to discuss it.    PAIN CONTROL INSTRUCTIONS    First, try either acetaminophen (Tylenol), or NSAID ibuprofen (Advil, Markus, etc), or NSAID naproxen (Aleve, Naprosyn)    Acetaminophen dosage : one 325-500 mg tablet taken every 4-6 hours with a maximum of 4000 mg/day = 4 g/day    Ibuprofen dosage : one 600 mg tablet taken every 4-6 hours with a maximum of 4-6 tablets/day    Naproxen dosage : one 500 mg tablet taken twice " daily with a maximum of 2 tablets/day     Second, try combining acetaminophen and an NSAID - often the combination will work better then either one alone.      TT: 20 minutes  CT: 15 minutes    The information in this document, created by the medical scribe for me, accurately reflects the services I personally performed and the decisions made by me. I have reviewed and approved this document for accuracy prior to leaving the patient care area.  May 9, 2019 7:00 AM  Franny North MD  [facility]

## 2019-05-09 NOTE — PROCEDURES
Name: Wide Excision  Indication: Wide excision of cyst.  Location(s): Left lower posterior shoulder- 4 mm erythematous subepidermal mass with overlying violaceous hue.  Completed by: Noelle North MD.  Photo Taken: NO.  Anesthesia: Patient was anesthetized by infiltrating the area surrounding the lesion with 1% lidocaine and epinephrine 1:[id number].  Note: Prior to procedure we discussed expectations for healing, risk of infection, and scar formation. Discussed other treatment options available. Discussed the risk of pain, infection, scarring, hypo- or hyperpigmentation and recurrence or need for re-treatment. The benefits of treatment and alternative treatments were also discussed.    During this procedure, the universal protocol was utilized. The patient's identity was confirmed by no less than two patient identifiers, correct procedure was verified, correct site was verified and marked as applicable and a final pause was completed.    Sterile technique was used throughout the procedure. The skin was cleaned and prepped with Chloroprep. A 4 mm margin was marked out on each side of the lesion. Sterile drapes were laid out. Once adequate anesthesia was obtained, an elliptical incision was made with a #10 blade through the epidermis and dermis. The tissue specimen was placed in formalin and sent to pathology.    Direct pressure and monopolar cautery was applied for hemostasis.  Edges were undermined with a Metzenbaum.  Defect was approximated with 0 Vicryl.  Edges were approximated with 6-0 Rapidgut interrupted simple stitch.  Minimal bleeding occurred. Dressing was applied and patient left in satisfactory condition.    Widest diameter: 1.0 cm.  Final length of defect: 3.5 cm.    Bandage change: 7 days.    Primary provider and referring provider will be informed regarding tissue sample report (and/or wound culture) when it returns.

## 2019-05-13 ENCOUNTER — ALLIED HEALTH/NURSE VISIT (OUTPATIENT)
Dept: NURSING | Facility: CLINIC | Age: 59
End: 2019-05-13
Payer: COMMERCIAL

## 2019-05-13 ENCOUNTER — TELEPHONE (OUTPATIENT)
Dept: FAMILY MEDICINE | Facility: CLINIC | Age: 59
End: 2019-05-13

## 2019-05-13 DIAGNOSIS — Z48.01 ENCOUNTER FOR CHANGE OR REMOVAL OF SURGICAL WOUND DRESSING: Primary | ICD-10-CM

## 2019-05-13 LAB — COPATH REPORT: NORMAL

## 2019-05-13 PROCEDURE — 99207 ZZC NO CHARGE NURSE ONLY: CPT

## 2019-05-13 NOTE — TELEPHONE ENCOUNTER
Left message on answering machine for patient to call back.  Monae Leger,RN BSN  [facility]  [phone]

## 2019-05-13 NOTE — TELEPHONE ENCOUNTER
Notes recorded by Franny North MD on 5/13/2019 at 1:32 PM CDT  Please call the patient and let them know that the tissue report was benign ( not cancer ) and consistent with a dermatofibroma    Thank you, Franny North M.D.

## 2019-05-13 NOTE — TELEPHONE ENCOUNTER
On May 16 th we will send a no charge for the this procedure - we also need to call Kalina Bernabe so she can coordinate with the lab so they do not charge patient as well.    Monae Leger RN BSN  [facility]  [phone]

## 2019-05-13 NOTE — NURSING NOTE
patient returned to [facility] to have bandage checked. She is concerned because of the stained bandage.  The top layer of bandage had blood leak through- it was dried and nothing is currently leaking. The brown skin tape is still present an this also has dried blood on it.  Advised patient that this is normal and expected to have mild drainage after the procedure. We do not remove the brown tape, but we can remove the gauze dressing and apply a new one.  I did this for patient  She also asked about the wound itching. Advised that this can be a part of the healing process or it can be from the adhesive.  Patient denies pain/heat/fever  Has 7 day appointment schedule- advised to keep this appointment and we will fully remove the bandages at that appointment    Patient verbalized understanding    Monae Leger RN BSN  [facility]  [phone]

## 2019-05-15 ENCOUNTER — ALLIED HEALTH/NURSE VISIT (OUTPATIENT)
Dept: NURSING | Facility: CLINIC | Age: 59
End: 2019-05-15
Payer: COMMERCIAL

## 2019-05-15 DIAGNOSIS — Z48.00 CHANGE OF DRESSING: Primary | ICD-10-CM

## 2019-05-15 PROCEDURE — 99207 ZZC NO CHARGE NURSE ONLY: CPT

## 2019-05-15 NOTE — PROGRESS NOTES
Patient arrived for a nurse only bandage appt today (5/15/19). Advised patient the appt is scheduled for tomorrow 5/16/19 340pm for bandage change and excision. Patient argued that she would not schedule an appt for 5/16/19 because she is going out of town.   I told patient please have a seat in exam 14 and we will work you in for the bandage change today.     I walked into exam 14 to talk to patient re: excision appt and if she would like to reschedule. Patient stated she did not have anything scheduled on her calendar for an excision and why does she need a 2nd excision. Patient continue to argue about bandage change appt and how it was scheduled incorrectly. Advised patient we can see the patient today for bandage change.    Patient stated she does not need bandage change because she saw the nurse on Monday 5/13/19. Advised patient we can talk to the nurse about whether or not she needs the appt, but we need to clarify and be on the same page re: her excision appt.    Patient stepped out of exam 14 to reschedule appt for excision. Patient agreed that she would like to reschedule the appt for her second excision. Appt rescheduled for 6/4/19 340pm 40 min excision of the L Shoulder with Dr. Franny North.

## 2019-05-15 NOTE — TELEPHONE ENCOUNTER
Patient came into clinic results were given-  advised there would be no charge for next visit.    Monae LegerRN BSN  [facility]  [phone]

## 2019-05-15 NOTE — NURSING NOTE
"Patient returned to clinic for post surgery 1 week follow up bandage change that was scheduled for 5/16/19 at 3:40 pm. When I became aware that patient was in clinic, patient was arguing with An the / rep about the appointment for tomorrow and I asked her to step into a room to discuss the issues.  I asked the patient if she had received my phone messages that I had left for her- patient states no she did not and asked if I called the 800 #- I checked demographics and read the number listed, which was nto a 800 #. Patient then stated that \"oh yes I heard your message\" . I asked why she did not call back? Patient stated that she was here now. I advised patient of the pathology results  (dermatofibroma).  Also advised that there would be a no charge for the next procedure, but the previous procedure was charged.  Advised the patient that if she had any concerns with this she could speak with the clinic administrator. There was confusion with the patient in the room when Bella was cleaning the original site of biopsy on 5/9/19- patient told bella it was a different spot (under her bra line). The area was also confirmed prior to the procedure with the provider.  Patient states that it was hard for her to tell which site it was- its on her back- and the provider already called her and apologized for the mistake. I advised her that the provider took out the lesion that the patient confirmed in the room. She called and apologized because it was the right thing to do  I advised that if there were any questions regarding these charges or the procedure, I was not the person to speak with about this. patient did verbalized understanding.    I brought patient into and exam room to change the bandages and because she was yelling at An across a patient checking in  Patient also had confusion with me and the directions that I had given her at the last visit on 5/13/19  Patient states that the nurse told " "her she did not need a 7 day bandage change because the bandage was changed at the 5/13/19 nurse visit- (please see phone encounter and Nurse visit). I advised patient that I was the nurse that checked the bandage on 5/13/19 in the same room we were currently in and I advised patient that we were just going to change the gauze not the entire bandage. The gauze had some blood drainage which is normal. I changed the gauze and gave the patient some gauze and tape just in case there was further drainage and advised patient that she did need to come in at the 7 days visit.  I advised patient that any time we do a larger excision there is a 14 day \"keep the bandage dry\" and a 7 day bandage change with the nurse  patient is still confused- so we went along with changing the bandage.  I explain as clearly as I could that the current bandage needs to stay in place and dry for another 7 day and on the 14th day from procedure she can gently take the bandage off and begin cleaning daily and massaging Vaseline daily. Advised patient not to pull or cut the sutures that are sticking out, they will dissolve in time     Patient has no complaints regarding the wound, denies pain.  Bandage removed from left back. Area cleansed with wound cleanser. Site is healing well with no signs of infection, wound edges approximating well.   Reapplied new steri strips and paper tape.     Advised to watch for signs and symptons of infection; spreading redness, increasing pain, drainage, odor, fever.   Call or report promptly to clinic if any of these symptoms are present.    Wound care post op instructions given and discussed for 14 day bandage removal and continued incision/scar care.    Patient verbalized understanding and I gave her an AVS with the next appointment dates and the wound care instructions.    Monae PEREZ RN  [facility]  [phone]        "

## 2019-08-19 ENCOUNTER — OFFICE VISIT (OUTPATIENT)
Dept: FAMILY MEDICINE | Facility: CLINIC | Age: 59
End: 2019-08-19
Payer: COMMERCIAL

## 2019-08-19 VITALS — DIASTOLIC BLOOD PRESSURE: 64 MMHG | SYSTOLIC BLOOD PRESSURE: 122 MMHG

## 2019-08-19 DIAGNOSIS — L72.0 EPIDERMOID CYST OF SKIN OF SHOULDER: Primary | ICD-10-CM

## 2019-08-19 PROCEDURE — 12032 INTMD RPR S/A/T/EXT 2.6-7.5: CPT | Performed by: FAMILY MEDICINE

## 2019-08-19 PROCEDURE — 11402 EXC TR-EXT B9+MARG 1.1-2 CM: CPT | Mod: 51 | Performed by: FAMILY MEDICINE

## 2019-08-19 PROCEDURE — 88304 TISSUE EXAM BY PATHOLOGIST: CPT | Mod: TC | Performed by: FAMILY MEDICINE

## 2019-08-19 NOTE — PROCEDURES
Name : Excision  Indication : Excision of irritated/enlarging cyst.  Location(s) : Left posterior shoulder: 6 mm in size freely mobile subepidermal mass most consistent with epidermoid cyst.  Completed by : Noelle North MD  Photo Taken : yes.  Anesthesia : Patient was anesthetized by infiltrating the area surrounding the lesion with 1% lidocaine.   epinephrine [id number] : Yes.  Note : Discussed risks of the excision procedure, including pain, infection, scarring, hypopigmentation, hyperpigmentation and potential for recurrence or need for retreatment. Benefits of treatment and alternative treatments were also discussed.    During this procedure, the universal protocol was utilized. The patient's identity was confirmed by no less than two patient identifiers, correct procedure was verified, correct site was verified and marked as applicable and a final pause was completed.    Sterile technique was used throughout the procedure. The skin was cleaned and prepped with Chloroprep. A 2 mm margin was marked out on each side of the lesion. Sterile drapes were laid out. Once adequate anesthesia was obtained, an elliptical incision was made with a #10 blade through the epidermis and dermis. The tissue specimen was placed in formalin and sent to pathology.    Direct pressure and monopolar cautery was applied for hemostasis.   Defect was approximated with 3-0 Vicryl.  Edges were approximated with 6-0 Rapidgut interrupted simple stitch.    No bleeding was present upon the completion of the procedure. A dry sterile dressing was applied. Patient tolerated the procedure well and was discharged in satisfactory condition.    Widest diameter: 1.4 cm.  Length: 4 cm.    Primary provider and referring provider will be informed regarding the wound culture and tissue sample report when it returns.    Bandage change: 7 days

## 2019-08-19 NOTE — Clinical Note
8/19/2019         RE: Pretty Parker  [address] [facility] [address]        Dear Colleague,    Thank you for referring your patient, Pretty Parker, to the [facility]. Please see a copy of my visit note below.    [facility] - PRIMARY CARE SKIN    CC: cyst   SUBJECTIVE:   Pretty Parker is a(n) 59 year old female who presents to clinic today for excision of a cyst on the left posterior shoulder.    This cyst was first evaluated in April 2019. Another lesion on the left lower posterior shoulder was excised on 5/9/2019; pathology indicated a dermatofibroma.    Personal Medical History  Skin Cancer: NO  Eczema Psoriasis Autoimmune   NO NO NO     Family Medical History  Skin Cancer: NO  Eczema Psoriasis Autoimmune   NO NO NO     Occupation: pharmacy tech (indoor).    Refer to electronic medical record (EMR) for past medical history and medications.    ROS: 14 point review of systems was negative except the symptoms listed above in the HPI.    This document serves as a record of the services and decisions personally performed and made by Franny North MD and was created by Chevy Mcdonald, a trained medical scribe, based on personal observations and provider statements to the medical scribe.  August 19, 2019 8:14 AM   Chevy Mcdonald    OBJECTIVE:   GENERAL: healthy, alert and no distress.  SKIN: Jimenez Skin Type - III.  Trunk, Arms were examined. The dermatoscope was used to help evaluate pigmented lesions.  Skin Pertinent Findings:  Left posterior shoulder: 6 mm in size freely mobile subepidermal mass most consistent with epidermoid cyst.      ASSESSMENT:     Encounter Diagnosis   Name Primary?     Epidermoid cyst of skin of shoulder Yes     MDM: ***  Discussion regarding treatment options for epidermoid cysts, including observation and excision.   Discussed risks of the excision procedure, including infection and scarring. It was explained that the cyst can reoccur, especially if  "it has become inflamed or infected prior to removal.    PLAN:   Patient Instructions   FUTURE APPOINTMENTS  Follow up in 7 days for bandage change with the nurse.    BANDAGE CARE INSTRUCTIONS    Keep dressing completely dry.    Make sure to avoid soaking the procedure area. Cover with Tegaderm or plastic wrap when showering.    Limit use of the area where the procedure was done for a few days to allow for optimal healing.    If you experience bleeding:  Wash hands and hold firm pressure on the area for 10 minutes without checking to see if the bleeding has stopped. \"Checking\" pulls off the protective wound clot and restarts the bleeding all over again. Re-apply pressure for 10 minutes if necessary to stop bleeding.  Use additional sterile gauze and tape to maintain pressure once bleeding has stopped.  If bleeding continues, then call back to clinic at [phone].    Signs of Infection:  Infection can occur in any area where skin has been disrupted.  If you notice persistent redness, swelling, colored drainage, increasing pain, fever or other signs of infection, please call us at: [phone] and ask to have me or my colleague paged. We will call you back to discuss.    Pathology Results:  You will be notified, generally via letter or MyChart, in approximately 10 days. If there is anything we need to discuss or further treatment needed, I will call you to discuss it.      TT: 40 minutes  CT: 10 minutes    The information in this document, created by the medical scribe for me, accurately reflects the services I personally performed and the decisions made by me. I have reviewed and approved this document for accuracy prior to leaving the patient care area.  August 19, 2019 8:15 AM  Franny North MD  [facility]    Again, thank you for allowing me to participate in the care of your patient.        Sincerely,        Franny North MD  "

## 2019-08-19 NOTE — PATIENT INSTRUCTIONS
"FUTURE APPOINTMENTS  Follow up in 7 days for bandage change with the nurse.    BANDAGE CARE INSTRUCTIONS    Keep dressing completely dry.    Make sure to avoid soaking the procedure area. Cover with Tegaderm or plastic wrap when showering.    Limit use of the area where the procedure was done for a few days to allow for optimal healing.    If you experience bleeding:  Wash hands and hold firm pressure on the area for 10 minutes without checking to see if the bleeding has stopped. \"Checking\" pulls off the protective wound clot and restarts the bleeding all over again. Re-apply pressure for 10 minutes if necessary to stop bleeding.  Use additional sterile gauze and tape to maintain pressure once bleeding has stopped.  If bleeding continues, then call back to clinic at [phone].    Signs of Infection:  Infection can occur in any area where skin has been disrupted.  If you notice persistent redness, swelling, colored drainage, increasing pain, fever or other signs of infection, please call us at: [phone] and ask to have me or my colleague paged. We will call you back to discuss.    Pathology Results:  You will be notified, generally via letter or MyChart, in approximately 10 days. If there is anything we need to discuss or further treatment needed, I will call you to discuss it.    "

## 2019-08-19 NOTE — PROGRESS NOTES
[facility] - PRIMARY CARE SKIN    CC: cyst   SUBJECTIVE:   Pretty Parker is a(n) 59 year old female who presents to clinic today for excision of a cyst on the left posterior shoulder.    This cyst was first evaluated in April 2019. Another lesion on the left lower posterior shoulder was excised on 5/9/2019; pathology indicated a dermatofibroma.    Personal Medical History  Skin Cancer: NO  Eczema Psoriasis Autoimmune   NO NO NO     Family Medical History  Skin Cancer: NO  Eczema Psoriasis Autoimmune   NO NO NO     Occupation: pharmacy tech (indoor).    Refer to electronic medical record (EMR) for past medical history and medications.    ROS: 14 point review of systems was negative except the symptoms listed above in the HPI.    This document serves as a record of the services and decisions personally performed and made by Franny North MD and was created by Chevy Mcdonald, a trained medical scribe, based on personal observations and provider statements to the medical scribe.  August 19, 2019 8:14 AM   Chevy Mcdonald    OBJECTIVE:   GENERAL: healthy, alert and no distress.  SKIN: Jimenez Skin Type - III.  Trunk, Arms were examined. The dermatoscope was used to help evaluate pigmented lesions.  Skin Pertinent Findings:  Left posterior shoulder: 6 mm in size freely mobile subepidermal mass most consistent with epidermoid cyst.      ASSESSMENT:     Encounter Diagnosis   Name Primary?     Epidermoid cyst of skin of shoulder Yes     MDM:   Discussion regarding treatment options for epidermoid cysts, including observation and excision.   Discussed risks of the excision procedure, including infection and scarring. It was explained that the cyst can reoccur, especially if it has become inflamed or infected prior to removal.    PLAN:   Patient Instructions   FUTURE APPOINTMENTS  Follow up in 7 days for bandage change with the nurse.    BANDAGE CARE INSTRUCTIONS    Keep dressing completely dry.    Make sure to avoid  "soaking the procedure area. Cover with Tegaderm or plastic wrap when showering.    Limit use of the area where the procedure was done for a few days to allow for optimal healing.    If you experience bleeding:  Wash hands and hold firm pressure on the area for 10 minutes without checking to see if the bleeding has stopped. \"Checking\" pulls off the protective wound clot and restarts the bleeding all over again. Re-apply pressure for 10 minutes if necessary to stop bleeding.  Use additional sterile gauze and tape to maintain pressure once bleeding has stopped.  If bleeding continues, then call back to clinic at [phone].    Signs of Infection:  Infection can occur in any area where skin has been disrupted.  If you notice persistent redness, swelling, colored drainage, increasing pain, fever or other signs of infection, please call us at: [phone] and ask to have me or my colleague paged. We will call you back to discuss.    Pathology Results:  You will be notified, generally via letter or MyChart, in approximately 10 days. If there is anything we need to discuss or further treatment needed, I will call you to discuss it.      TT: 40 minutes  CT: 10 minutes    The information in this document, created by the medical scribe for me, accurately reflects the services I personally performed and the decisions made by me. I have reviewed and approved this document for accuracy prior to leaving the patient care area.  August 19, 2019 8:15 AM  Franny North MD  [facility]  "

## 2019-08-21 ENCOUNTER — TELEPHONE (OUTPATIENT)
Dept: FAMILY MEDICINE | Facility: CLINIC | Age: 59
End: 2019-08-21

## 2019-08-21 LAB — COPATH REPORT: NORMAL

## 2019-08-21 NOTE — TELEPHONE ENCOUNTER
----- Message from Franny North MD sent at 8/21/2019  3:01 PM CDT -----  Please call Pretty and let her know that the tissue report is consistent with a benign cyst.     Thank you,  Franny North M.D.

## 2019-08-21 NOTE — LETTER
August 23, 2019    Pretty Parker  [address] [facility] [address]        Dear Pretty,    Great news! The lesion(s) that was removed has been found to be benign (not cancer) and is called a normal cyst. Thus, no further treatment is needed.        Thank you for allowing me to be involved in your health care and for choosing [facility].  If you have any questions or concerns please feel free to contact me at [phone].      Sincerely,      Franny North M.D.

## 2019-08-22 NOTE — TELEPHONE ENCOUNTER
Left message on answering machine for patient to call back.    Monae Leger,RN BSN  [facility]  [phone]

## 2019-08-22 NOTE — TELEPHONE ENCOUNTER
Patient returning triage call. She stated call her back later or tomorrow, Friday.  Thank you  Inga Cloud

## 2019-08-23 NOTE — TELEPHONE ENCOUNTER
Left message on answering machine for patient to call back- also sending letter with test results and notifed on voice mail of results letter coming to the home    Monae LegerRN BSN  [facility]  [phone]

## 2019-08-26 ENCOUNTER — ALLIED HEALTH/NURSE VISIT (OUTPATIENT)
Dept: NURSING | Facility: CLINIC | Age: 59
End: 2019-08-26
Payer: COMMERCIAL

## 2019-08-26 DIAGNOSIS — Z48.00 CHANGE OF DRESSING: Primary | ICD-10-CM

## 2019-08-26 PROCEDURE — 99207 ZZC NO CHARGE NURSE ONLY: CPT

## 2019-08-26 NOTE — PATIENT INSTRUCTIONS
WOUND CARE INSTRUCTIONS  for  ONE WEEK AFTER SURGERY              1. Leave flat bandage on your skin for one week after today s bandage change.  2. In one week when you remove the bandage, you may resume your regular skin care routine, including washing with mild soap and water, applying moisturizer, make-up and sunscreen.     3. If there are any open or bleeding areas at the incision/graft site you should begin to cover the area with a bandage daily as follows:     1. Clean and dry the area with plain tap water using a Q-tip or sterile gauze pad.  2. Apply Polysporin or Bacitracin ointment to the open area.  3. Cover the wound with a band-aid or a sterile non-stick gauze pad and micropore paper tape.                  *Once the bandages are removed, the scar will be red and firm (especially in the lip/chin area). This is normal and will fade in time. It might take 6-12 months for this to happen.      *Massaging the area will help the scar soften and fade quicker. Begin to massage the area one month after the bandages have been removed. To massage apply pressure directly and firmly over the scar with the fingertips and move in a circular motion. Massage the area for a few minutes several times a day. Continue to massage the site for several months.     *Approximately 6-8 weeks after surgery it is not uncommon to see the formation of  tender pimple-like  bump along the scar. This is normal. As the scar continues to mature and the stitches underneath the skin begin to dissolve, this might occur. Do not pick or squeeze, this will resolve on it s own. Should one break open producing a small amount of drainage, apply Polysporin or Bacitracin ointment a few times a day until the wound is completely healed.     *Numbness in the surgical area is expected. It might take 12-18 months for the feeling to return to normal. During this time sensations of itchiness, tingling and occasional sharp pains might be noted. These feelings  are normal and will subside once the nerves have completely healed.

## 2019-09-25 ENCOUNTER — OFFICE VISIT (OUTPATIENT)
Dept: FAMILY MEDICINE | Facility: CLINIC | Age: 59
End: 2019-09-25
Payer: COMMERCIAL

## 2019-09-25 VITALS — SYSTOLIC BLOOD PRESSURE: 122 MMHG | DIASTOLIC BLOOD PRESSURE: 64 MMHG

## 2019-09-25 DIAGNOSIS — T81.89XA SUTURE REACTION, INITIAL ENCOUNTER: Primary | ICD-10-CM

## 2019-09-25 PROCEDURE — 99024 POSTOP FOLLOW-UP VISIT: CPT | Performed by: FAMILY MEDICINE

## 2019-09-25 NOTE — PROGRESS NOTES
[facility] - PRIMARY CARE SKIN    CC: suture   SUBJECTIVE:   Pretty Parker is a(n) 59 year old female who presents to clinic today because of feeling of something sticking out. She had a excision of epidermoid cyst on 08/19/2019.         Refer to electronic medical record (EMR) for past medical history and medications.      ROS: 14 point review of systems was negative except the symptoms listed above in the HPI.        OBJECTIVE:   GENERAL: healthy, alert and no distress.  SKIN: Jimenez Skin Type - II.  Trunk examined. The dermatoscope was used to help evaluate pigmented lesions.  Skin Pertinent Findings:  Left lateral upper back - well healed, protruding internal stitch, Clipped with iris scrissors  Diagnostic Test Results:  none     ASSESSMENT:     Encounter Diagnosis   Name Primary?     Suture reaction, initial encounter Yes       PLAN:   Patient Instructions   Recheck if needed      TT: 20 minutes.  CT: 15 minutes.    The information in this document, created by the medical scribe for me, accurately reflects the services I personally performed and the decisions made by me. I have reviewed and approved this document for accuracy prior to leaving the patient care area.  September 25, 2019 10:41 AM  Franny North MD  [facility]

## 2019-10-24 ENCOUNTER — TELEPHONE (OUTPATIENT)
Dept: FAMILY MEDICINE | Facility: CLINIC | Age: 59
End: 2019-10-24

## 2019-10-24 NOTE — TELEPHONE ENCOUNTER
patient calling because she was seen 09/25/19 by Dr. North for surgery complications.  Was told by Dr. North this would be a no charge appointment and to call if she was billed.  Patient states she was billed.    Please advise,  Monae STERLINGRN BSN  [facility]  [phone]

## 2019-10-28 NOTE — TELEPHONE ENCOUNTER
Please call and let her know that I am sending this to my clinic manager. I am sorry for the inconvenience.     Thank you,   Franny North M.D.

## 2019-10-29 NOTE — TELEPHONE ENCOUNTER
Called and spoke to patient.    Informed patient that Dr. North has sent her billing concern to her clinic manager. Apologized to patient on Dr. Gruber behalf for any inconvenience. Asked patient to call back if she hasn't heard anything before the end of the week.     Patient voiced understanding.    KATIE Conte-BSN-N  [facility]  [phone]

## 2019-11-22 ENCOUNTER — THERAPY VISIT (OUTPATIENT)
Dept: CHIROPRACTIC MEDICINE | Facility: CLINIC | Age: 59
End: 2019-11-22
Payer: COMMERCIAL

## 2019-11-22 DIAGNOSIS — M99.01 CERVICAL SEGMENT DYSFUNCTION: Primary | ICD-10-CM

## 2019-11-22 DIAGNOSIS — M99.04 SOMATIC DYSFUNCTION OF SACRAL REGION: ICD-10-CM

## 2019-11-22 DIAGNOSIS — M54.50 CHRONIC LEFT-SIDED LOW BACK PAIN WITHOUT SCIATICA: ICD-10-CM

## 2019-11-22 DIAGNOSIS — M99.03 SOMATIC DYSFUNCTION OF LUMBAR REGION: ICD-10-CM

## 2019-11-22 DIAGNOSIS — G89.29 CHRONIC LEFT-SIDED LOW BACK PAIN WITHOUT SCIATICA: ICD-10-CM

## 2019-11-22 DIAGNOSIS — M53.3 SACRAL PAIN: ICD-10-CM

## 2019-11-22 DIAGNOSIS — M54.2 CERVICALGIA: ICD-10-CM

## 2019-11-22 PROCEDURE — 98941 CHIROPRACT MANJ 3-4 REGIONS: CPT | Mod: AT | Performed by: CHIROPRACTOR

## 2019-11-22 PROCEDURE — 97112 NEUROMUSCULAR REEDUCATION: CPT | Mod: 59 | Performed by: CHIROPRACTOR

## 2019-11-22 PROCEDURE — 99203 OFFICE O/P NEW LOW 30 MIN: CPT | Mod: 25 | Performed by: CHIROPRACTOR

## 2019-11-25 PROBLEM — M54.2 CERVICALGIA: Status: ACTIVE | Noted: 2019-11-25

## 2019-11-25 PROBLEM — M99.03 SOMATIC DYSFUNCTION OF LUMBAR REGION: Status: ACTIVE | Noted: 2019-11-25

## 2019-11-25 PROBLEM — M99.01 CERVICAL SEGMENT DYSFUNCTION: Status: ACTIVE | Noted: 2019-11-25

## 2019-11-25 PROBLEM — M53.3 SACRAL PAIN: Status: ACTIVE | Noted: 2019-11-25

## 2019-11-25 PROBLEM — M99.04 SOMATIC DYSFUNCTION OF SACRAL REGION: Status: ACTIVE | Noted: 2019-11-25

## 2019-11-25 NOTE — PROGRESS NOTES
Chiropractic Clinic Visit    PCP: No Ref-Primary, Physician    Pretty Parker is a 59 year old female who is seen  as a self referral presenting with chronic and intermittent neck pain, low back pain. When asked, patient denies:, falling, slipping, bending and reaching or sleeping awkwardly.Patient reports that the onset was over 10 years ago She has had Chiropractic treatment with good results. The px is bilateral in the neck and low back area. She noted an increase in pain from having B knee pain and having gait pattern changes. 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 The pt grades the px a 4-7/10 on VAS. Sitting will increase the px. Sleeping is interrupted and she reports pain with transitional movements. The pt denies weakness or other unusual sx .  Prior to onset, the patient was able to sit for 8 hours. Patient notes that due to symptoms, they can only sleep interrupted. Pretty Parker notes  walking rated at a 7/10 painful, difficult and prior to this incident it was 3/10.        Injury: There was no acute injury related to this episode    Location of Pain: bilateral neck and low back  at the following level(s) C2 , C5 , T1 , T6 , L5 , Sacrum  and PSIS Left   Duration of Pain: over 10 years   Rating of Pain at worst: 7/10  Rating of Pain Currently: 4/10  Symptoms are better with: prior Chiropractic   Symptoms are worse with: sitting and transitional movements, sleeping   Additional Features: Luis's neuroma, B knee pain     Other evaluation and/or treatments so far consists of: none     Health History  as reported by the patient:    How does the patient rate their own health:   Good    Current or past medical history:   No red flags identified    Medical allergies  Other: penicillin     Past Traumas/Surgeries  Other:  L5/S1  fusion    Family History  Family History   Problem Relation Age of Onset     Coronary Artery Disease Maternal Grandmother 69        Medications:  None    Occupation:  Pharm tech     Primary job tasks:   Prolonged sitting and Prolonged standing    Barriers as home/work:   none    Additional health Issues:     None               Review of Systems  Musculoskeletal: as above  Remainder of review of systems is negative including constitutional, CV, pulmonary, GI, Skin and Neurologic except as noted in HPI or medical history.    History reviewed. No pertinent past medical history.  Past Surgical History:   Procedure Laterality Date     APPENDECTOMY       ESOPHAGOSCOPY, GASTROSCOPY, DUODENOSCOPY (EGD), COMBINED N/A 1/24/2019    Procedure: COMBINED ESOPHAGOSCOPY, GASTROSCOPY, DUODENOSCOPY (EGD), BIOPSY SINGLE OR MULTIPLE;  Surgeon: Manolo Solares MD;  Location:  GI     low back surgery       ulnar nerve decompression         Objective  There were no vitals taken for this visit.    GENERAL APPEARANCE: healthy, alert and no distress   GAIT: NORMAL  SKIN: no suspicious lesions or rashes  NEURO: Normal strength and tone, mentation intact and speech normal  PSYCH:  mentation appears normal and affect normal/bright      Pretty was asked to complete the Neck Disability Index, the Oswestry Low Back Disability Index and Myron Start Back screening tool, today in the office. NDI Disability score: pending %; pain severity scale: 7/10., The Oswestry Disability score: 6%. Keel Start Total Score:2 Sub Score: 1       Cervical Spine Exam    Range of Motion:         Full active and passive ROM forward flexion,   Decreased extension, lateral rotation, lateral flexion with pain at end range     Inspection:         No visible deformity        normal lordotic curvature maintained  Anterior neck carriage, slumped seated posture, poor posture, increased kyphosis    Tender:        B suboccipitals     Non-Tender:        remainder of cervical spine area    Muscle strength:       C4 (shoulder shrug)  symmetric 5/5 Normal       C5 (shoulder abduction) symmetric 5/5  "Normal       C6 (elbow flexion) symmetric 5/5 Normal       C7 (elbow extension) symmetric 5/5 Normal       C8 (finger abduction, thumb flexion) symmetric 5/5 Normal    Reflexes:        C5 (biceps) symmetric 2 bilaterally       C6 (supinator) symmetric 2 bilaterally       C7 (triceps) symmetric 2 bilaterally      Sensation:       grossly intact througout bilateral upper extremities    Special Tests:       positive (+) Spurling  Olvin's- positive, VBI- negative and Calvo Temple - negative    Lymphatics:        no edema noted in the upper extremities       Lumbar exam:    Inspection:  \"     no visible deformity in the low back       normal skin\"  Slumped seated posture, anterior pelvic flexion with sacral/coccyx seated posture, Increased thoracic kyphosis with subsequent anterior cervical carriage. Poor seated posture      ROM:       full flexion       limited extension due to pain-L SI joint pain    Tender:       paraspinal muscles       B QL     Non Tender:       remainder of lumbar spine    Strength:       hip flexion 5/5 Normal       knee extension 5/5 Normal       ankle dorsiflexion 5/5 Normal       ankle plantarflexion 5/5 Normal       dorsiflexion of the great toe 5/5 Normal    Reflexes:       patellar (L3, L4) 2 bilaterally       achilles tendons (S1) 2 bilaterally        Sensation:      grossly intact throughout lower extremities    Special tests:  Kemps - Right positive, low back pain and Left positive low back pain, SLR - Right negative and Left negative, Gaenslen's - Right negative and Left negative and Fabere - Right positive and Left hip pain    Segmental spinal dysfunction/restrictions found at:  C2 , C5 , T1 , T6 , L5 , Sacrum  and PSIS Left     The following soft tissue hypotonicities were observed:Quad lumb: bilateral, referred pain: no  Lev scapulae: ache and dull pain, referred pain: no  Sub-occiput: ache and dull pain, referred pain: no    Trigger points were found in: none     Muscle spasm found " in:Lumbar erector spine, Quad lumb and Sub-occipital      Radiology:  None     Assessment:    1. Cervical segment dysfunction    2. Cervicalgia    3. Chronic left-sided low back pain without sciatica    4. Somatic dysfunction of lumbar region    5. Somatic dysfunction of sacral region    6. Sacral pain        RX ordered/plan of care  Anticipated outcomes  Possible risks and side effects    After discussing the risk and benefits of care, patient consented to treatment    Prognosis: Good      Patient's condition:  Patient had restrictions pre-manipulation    Treatment effectiveness:  Post manipulation there is better intersegmental movement and Patient claims to feel looser post manipulation      Plan:    Procedures:  Evaluation and Management  77223 Moderate level exam 30 min    CMT:  77423 Chiropractic manipulative treatment 3-4 regions performed   Cervical: Diversified, C2, C5 , Prone, Supine  Thoracic: Diversified, T5, Prone  Lumbar: Diversified, L5, Side posture  Pelvis: Drop Table, Sacrum , PSIS Left , Prone    Modalities:  None performed this visit    Therapeutic procedures:  74599: Neurological re-education/proprioception training and proper long term sitting posture: Corrected patient's seated posture when sitting for longer than 20 minutes or seated at the computer related to work duties for over 8 hours per day. Fit patient with Eli lumbar support for postural re-training with demonstration. Showed patient how to place the support correctly when seated and to increase usage by 2-3 hour increments per day until they are able to sit full time without spinal irritation. Related improper vs. proper sitting to optimal spinal biomechanics using the spine model with demonstration with the purpose of PREVENTING premature spinal degeneration from cumulative static motion. Demonstrated the increase in load and shearing forces on the spine in addition to the cumulative degenerative effects of axial compression on  a spine that is chronically slumped and in an 'unlocked' position vs a 'locked' position. Demonstrated the use of a lap top table for lap top computers to prevent excessive cervical flexion of the neck. Demonstrated 'hip hinging' to access the computer when seated rather than thoracic flexion. Gave cervical retraction for proper cervical alignment, anterior deep cervical flexor strengthening and cervical proprioception training with demonstration. Per 10-12 minutes total        Response to Treatment  Reduction in symptoms as reported by patient    Treatment plan and goals:  Goals:  SITTING: the patient specific goal is to attain pre-injury status of  8 hours comfortably  Transitional movements  Sleeping 6-8 hours uninterrupted     Frequency of care  Duration of care is estimated to be 6-8 weeks, from the initial treatment.  It is estimated that the patient will need a total of 6-8 visits to resolve this episode.  For the initial therapeutic trial of care, the frequency is recommended at 1 X week, once daily.  A reevaluation would be clinically appropriate in 6-8 visits, to determine progress and further course of care.    In-Office Treatment  Evaluation  Spinal Chiropractic Manipulative Therapy  Postural correction        Recommendations:    Instructions:  use lumbar support     Follow-up:    Return to care in 1 week with ACP.       Disclaimer: This note consists of symbols derived from keyboarding, dictation and/or voice recognition software. As a result, there may be errors in the script that have gone undetected. Please consider this when interpreting information found in this chart.

## 2019-11-25 NOTE — TELEPHONE ENCOUNTER
Patient calling, stated it has been a month since she last talked to the clinic. She is still receiving the bills for her surgery. Now, it states past due.   Please advise  [phone] (home)   Thank you  Inga Cloud

## 2019-12-02 ENCOUNTER — THERAPY VISIT (OUTPATIENT)
Dept: CHIROPRACTIC MEDICINE | Facility: CLINIC | Age: 59
End: 2019-12-02
Payer: COMMERCIAL

## 2019-12-02 DIAGNOSIS — M53.3 SACRAL PAIN: ICD-10-CM

## 2019-12-02 DIAGNOSIS — G89.29 CHRONIC LEFT-SIDED LOW BACK PAIN WITHOUT SCIATICA: ICD-10-CM

## 2019-12-02 DIAGNOSIS — M54.2 CERVICALGIA: ICD-10-CM

## 2019-12-02 DIAGNOSIS — M99.04 SOMATIC DYSFUNCTION OF SACRAL REGION: ICD-10-CM

## 2019-12-02 DIAGNOSIS — M99.01 CERVICAL SEGMENT DYSFUNCTION: Primary | ICD-10-CM

## 2019-12-02 DIAGNOSIS — M99.03 SOMATIC DYSFUNCTION OF LUMBAR REGION: ICD-10-CM

## 2019-12-02 DIAGNOSIS — M54.50 CHRONIC LEFT-SIDED LOW BACK PAIN WITHOUT SCIATICA: ICD-10-CM

## 2019-12-02 PROCEDURE — 98941 CHIROPRACT MANJ 3-4 REGIONS: CPT | Mod: AT | Performed by: CHIROPRACTOR

## 2019-12-02 PROCEDURE — 97810 ACUP 1/> WO ESTIM 1ST 15 MIN: CPT | Mod: GA | Performed by: CHIROPRACTOR

## 2019-12-02 NOTE — PROGRESS NOTES
Visit #:  2    Subjective:  Pretty Parker is a 59 year old female who is seen in f/u up for:        Cervical segment dysfunction  Cervicalgia  Somatic dysfunction of lumbar region  Chronic left-sided low back pain without sciatica  Sacral pain  Somatic dysfunction of sacral region.     Since last visit on Visit date not found,  Pretty Parker reports:    Area of chief complaint:  Cervical, Thoracic and Lumbar :  Symptoms are graded at 4/10. The quality is described as stiff, achey, dull.  Motion has increased, but is still not normal. The pt reports about 40% improvement since the initial treatment. She is pleased with her progress. She was using an exercise machine over the weekend and noted an increase in L foot pain which affected the low back and the R knee.  She is having issues walking without pain. The pt denies weakness or other unusual sx.  Patient feels that they are improved due to a reduction in symptoms.     Since last visit the patient feels that they are 40 percent  improved from last visit.       Objective:  The following was observed:    P: palpatory tenderness Lumbar erector spine and Quad lumb Bilaterally, B suboccipitals   A: static palpation demonstrates intersegmental asymmetry   R: motion palpation notes restricted motion  T: hypertonicity at: Lumbar erector spine and Quad lumb Bilaterally, suboccipitals     Segmental spinal dysfunction/restrictions found at:  C2 , C7 , T5 , T8 , L5 , Sacrum  and PSIS Right       Assessment:    Diagnoses:      1. Cervical segment dysfunction    2. Cervicalgia    3. Somatic dysfunction of lumbar region    4. Chronic left-sided low back pain without sciatica    5. Sacral pain    6. Somatic dysfunction of sacral region        Patient's condition:  Patient had restrictions pre-manipulation    Treatment effectiveness:  Post manipulation there is better intersegmental movement and Patient claims to feel looser post manipulation      Procedures:  CMT:  92746  Chiropractic manipulative treatment 3-4 regions performed   Thoracic: Diversified, T1, T5, T8, Prone  Lumbar: Diversified, L5, Side posture  Pelvis: Drop Table, Sacrum , PSIS Right , Prone    Modalities:  54160: Acupuncture, for 15 minutes:  Points: Ahsi point in B knees and L foot including hips   For 15 minutes,     Therapeutic procedures:  None    Response to Treatment  Reduction in symptoms     Prognosis: Excellent    Progress towards Goals: Patient is making progress towards the goal.   Goals:  SITTING: the patient specific goal is to attain pre-injury status of  8 hours comfortably  Transitional movements  Sleeping 6-8 hours uninterrupted       Recommendations:    Instructions:  continue to use lumbar support     Follow-up:    Return to care in 1 week with ACP.

## 2019-12-04 NOTE — TELEPHONE ENCOUNTER
Spoke to patient at length about charges. Per our billing office, the visit from May 2019 was already taken care of administratively. There are no charges remaining for patient responsibility for May visit, August visit or September visits with Dr. North.   Patient was in the car when I called and will call me back once she has a copy of the bill. I also suggested we meet in person to review the bill she is referencing.    Kalina Bernabe  Clinic Administrator

## 2020-04-16 ENCOUNTER — HOSPITAL ENCOUNTER (OUTPATIENT)
Dept: MAMMOGRAPHY | Facility: CLINIC | Age: 60
End: 2020-04-16
Attending: FAMILY MEDICINE
Payer: COMMERCIAL

## 2020-04-16 DIAGNOSIS — N64.59 INVERTED NIPPLE: ICD-10-CM

## 2020-04-16 PROCEDURE — 76642 ULTRASOUND BREAST LIMITED: CPT | Mod: RT

## 2020-04-16 PROCEDURE — 77066 DX MAMMO INCL CAD BI: CPT

## 2020-07-24 ENCOUNTER — THERAPY VISIT (OUTPATIENT)
Dept: CHIROPRACTIC MEDICINE | Facility: CLINIC | Age: 60
End: 2020-07-24
Payer: COMMERCIAL

## 2020-07-24 DIAGNOSIS — M99.03 SOMATIC DYSFUNCTION OF LUMBAR REGION: ICD-10-CM

## 2020-07-24 DIAGNOSIS — M99.01 CERVICAL SEGMENT DYSFUNCTION: Primary | ICD-10-CM

## 2020-07-24 DIAGNOSIS — M53.3 SACRAL PAIN: ICD-10-CM

## 2020-07-24 DIAGNOSIS — M54.2 CERVICALGIA: ICD-10-CM

## 2020-07-24 DIAGNOSIS — M99.04 SOMATIC DYSFUNCTION OF SACRAL REGION: ICD-10-CM

## 2020-07-24 PROCEDURE — 98941 CHIROPRACT MANJ 3-4 REGIONS: CPT | Mod: AT | Performed by: CHIROPRACTOR

## 2020-07-24 PROCEDURE — 99213 OFFICE O/P EST LOW 20 MIN: CPT | Mod: 25 | Performed by: CHIROPRACTOR

## 2020-07-26 PROBLEM — G89.29 CHRONIC BILATERAL LOW BACK PAIN WITHOUT SCIATICA: Status: ACTIVE | Noted: 2019-11-25

## 2020-07-26 PROBLEM — M54.50 CHRONIC BILATERAL LOW BACK PAIN WITHOUT SCIATICA: Status: ACTIVE | Noted: 2019-11-25

## 2020-07-26 PROBLEM — G89.29 CHRONIC LEFT-SIDED LOW BACK PAIN WITHOUT SCIATICA: Status: RESOLVED | Noted: 2019-11-25 | Resolved: 2020-07-26

## 2020-07-26 PROBLEM — M54.50 CHRONIC LEFT-SIDED LOW BACK PAIN WITHOUT SCIATICA: Status: RESOLVED | Noted: 2019-11-25 | Resolved: 2020-07-26

## 2020-07-27 NOTE — PROGRESS NOTES
Chiropractic Clinic Visit    PCP: No Ref-Primary, Physician    Pretty Parker is a 60 year old female who is seen  as a self referral presenting with chronic and intermittent neck pain, low back pain. When asked, patient denies:, falling, slipping, bending and reaching or sleeping awkwardly.Patient reports that the onset was over 10 years ago She has had Chiropractic treatment with good results. The px is bilateral in the neck and low back area. She noted an increase in pain from having B knee pain and having gait pattern changes.Since the previous treatment, the knee pain seems to have subsided.  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 The pt grades the px a 6/10 on VAS. Sitting will increase the px. Sleeping is interrupted and she reports pain mostly when standing. Standing will cause some pain in the low back. The pt is using the lumbar support with good results. She notes pain when turning her head.  The pt denies weakness or other unusual sx .  Prior to onset, the patient was able to stand  for 6 hours. Patient notes that due to symptoms, they can only sleep interrupted. Pretty Parker notes  walking rated at a 6/10 painful, difficult and prior to this incident it was 2/10.        Injury: There was no acute injury related to this episode    Location of Pain: bilateral neck and low back  at the following level(s) OCC R, C2 , C7 , T1 , T8 , L5 , Sacrum  and PSIS RIGHT   Duration of Pain: over 10 years   Rating of Pain at worst: 6/10  Rating of Pain Currently: 6/10  Symptoms are better with: prior Chiropractic   Symptoms are worse with: sitting and transitional movements, sleeping   Additional Features: Luis's neuroma, B knee pain     Other evaluation and/or treatments so far consists of: none     Health History  as reported by the patient:    How does the patient rate their own health:   Good    Current or past medical history:   No red flags identified    Medical  allergies  Other: penicillin     Past Traumas/Surgeries  Other:  L5/S1  fusion    Family History  Family History   Problem Relation Age of Onset     Coronary Artery Disease Maternal Grandmother 69       Medications:  Ibuprofen     Occupation:  Pharm tech     Primary job tasks:   Prolonged sitting and Prolonged standing, computer work, repetitive tasks     Barriers as home/work:   none    Additional health Issues:     None               Review of Systems  Musculoskeletal: as above  Remainder of review of systems is negative including constitutional, CV, pulmonary, GI, Skin and Neurologic except as noted in HPI or medical history.    History reviewed. No pertinent past medical history.  Past Surgical History:   Procedure Laterality Date     APPENDECTOMY       ESOPHAGOSCOPY, GASTROSCOPY, DUODENOSCOPY (EGD), COMBINED N/A 1/24/2019    Procedure: COMBINED ESOPHAGOSCOPY, GASTROSCOPY, DUODENOSCOPY (EGD), BIOPSY SINGLE OR MULTIPLE;  Surgeon: Manolo Solares MD;  Location:  GI     low back surgery       ulnar nerve decompression         Objective  There were no vitals taken for this visit.    GENERAL APPEARANCE: healthy, alert and no distress   GAIT: NORMAL  SKIN: no suspicious lesions or rashes  NEURO: Normal strength and tone, mentation intact and speech normal  PSYCH:  mentation appears normal and affect normal/bright      Pretty was asked to complete the Neck Disability Index, the Oswestry Low Back Disability Index and Myron Start Back screening tool, today in the office. NDI Disability score: pending %; pain severity scale: 6/10., The Oswestry Disability score: 18%. Keel Start Total Score:4 Sub Score: 1       Cervical Spine Exam    Range of Motion:         Full active and passive ROM forward flexion,   Decreased extension, lateral rotation, lateral flexion with pain at end range     Inspection:         No visible deformity        normal lordotic curvature maintained      Tender:        B suboccipitals     Non-Tender:  "       remainder of cervical spine area    Muscle strength:       C4 (shoulder shrug)  symmetric 5/5 Normal       C5 (shoulder abduction) symmetric 5/5 Normal       C6 (elbow flexion) symmetric 5/5 Normal       C7 (elbow extension) symmetric 5/5 Normal       C8 (finger abduction, thumb flexion) symmetric 5/5 Normal    Reflexes:        C5 (biceps) symmetric 2 bilaterally       C6 (supinator) symmetric 2 bilaterally       C7 (triceps) symmetric 2 bilaterally      Sensation:       grossly intact througout bilateral upper extremities    Special Tests:       positive (+) Spurling  Olvin's- positive, VBI- negative and Calvo Temple - negative    Lymphatics:        no edema noted in the upper extremities       Lumbar exam:    Inspection:  \"     no visible deformity in the low back       normal skin\"  Slumped seated posture, anterior pelvic flexion with sacral/coccyx seated posture, Increased thoracic kyphosis with subsequent anterior cervical carriage. Poor seated posture      ROM:       full flexion       limited extension due to pain-L SI joint pain    Tender:       paraspinal muscles       B QL     Non Tender:       remainder of lumbar spine    Strength:       hip flexion 5/5 Normal       knee extension 5/5 Normal       ankle dorsiflexion 5/5 Normal       ankle plantarflexion 5/5 Normal       dorsiflexion of the great toe 5/5 Normal    Reflexes:       patellar (L3, L4) 2 bilaterally       achilles tendons (S1) 2 bilaterally        Sensation:      grossly intact throughout lower extremities    Special tests:  Kemps - Right positive, low back pain and Left negative  low back pain, SLR - Right negative and Left negative, Gaenslen's - Right negative and Left negative and Fabere - Right positive and Left hip pain    Segmental spinal dysfunction/restrictions found at:  OCC R, C2 , C7 , T1 , T8 , L5 , Sacrum  and PSIS RIGHT    The following soft tissue hypotonicities were observed:Quad lumb: bilateral, referred pain: no  Lev " scapulae: ache and dull pain, referred pain: no  Sub-occiput: ache and dull pain, referred pain: no    Trigger points were found in: none     Muscle spasm found in:Lumbar erector spine, Quad lumb and Sub-occipital      Radiology:  None     Assessment:    1. Cervical segment dysfunction    2. Cervicalgia    3. Somatic dysfunction of sacral region    4. Sacral pain    5. Somatic dysfunction of lumbar region        RX ordered/plan of care  Anticipated outcomes  Possible risks and side effects    After discussing the risk and benefits of care, patient consented to treatment    Prognosis: Good      Patient's condition:  Patient had restrictions pre-manipulation    Treatment effectiveness:  Post manipulation there is better intersegmental movement and Patient claims to feel looser post manipulation      Plan:    Procedures:  Evaluation and Management  30508 Moderate level exam 30 min    CMT:  60155 Chiropractic manipulative treatment 3-4 regions performed   Cervical: Diversified, C2, C7, Prone, Supine  Thoracic: Diversified, T8, Prone  Lumbar: Diversified, L5, Side posture  Pelvis: Drop Table, Sacrum , PSIS Right , Prone    Modalities:  None performed this visit    Therapeutic procedures:  None     Response to Treatment  Reduction in symptoms as reported by patient    Treatment plan and goals:  Goals:  SITTING: the patient specific goal is to attain pre-injury status of  8 hours comfortably  Transitional movements  Standing for 8 hours     Frequency of care  Duration of care is estimated to be 6-8 weeks, from the initial treatment.  It is estimated that the patient will need a total of 6-8 visits to resolve this episode.  For the initial therapeutic trial of care, the frequency is recommended at 1 X week, once daily.  A reevaluation would be clinically appropriate in 6-8 visits, to determine progress and further course of care.    In-Office Treatment  Evaluation  Spinal Chiropractic Manipulative  Therapy          Recommendations:    Instructions:  use lumbar support     Follow-up:    Return to care in 1 week     Disclaimer: This note consists of symbols derived from keyboarding, dictation and/or voice recognition software. As a result, there may be errors in the script that have gone undetected. Please consider this when interpreting information found in this chart.

## 2020-08-03 NOTE — TELEPHONE ENCOUNTER
----- Message from Ed Patel M.D. sent at 8/3/2020  7:56 AM PDT -----    Please call patient :     Zonegran level is low.   Pls change the dose to : 200mg in am and 100mg at bed time  Pt to repeat zonegran lab test in 4 wks.  Pt to [facility]   Sent email to Kalina Bernabe regarding this issue.     Franny North M.D.

## 2020-08-07 ENCOUNTER — THERAPY VISIT (OUTPATIENT)
Dept: CHIROPRACTIC MEDICINE | Facility: CLINIC | Age: 60
End: 2020-08-07
Payer: COMMERCIAL

## 2020-08-07 DIAGNOSIS — M99.03 SOMATIC DYSFUNCTION OF LUMBAR REGION: ICD-10-CM

## 2020-08-07 DIAGNOSIS — G89.29 CHRONIC BILATERAL LOW BACK PAIN WITHOUT SCIATICA: ICD-10-CM

## 2020-08-07 DIAGNOSIS — M99.04 SOMATIC DYSFUNCTION OF SACRAL REGION: ICD-10-CM

## 2020-08-07 DIAGNOSIS — M53.3 SACRAL PAIN: ICD-10-CM

## 2020-08-07 DIAGNOSIS — M54.2 CERVICALGIA: ICD-10-CM

## 2020-08-07 DIAGNOSIS — M99.01 CERVICAL SEGMENT DYSFUNCTION: Primary | ICD-10-CM

## 2020-08-07 DIAGNOSIS — M54.50 CHRONIC BILATERAL LOW BACK PAIN WITHOUT SCIATICA: ICD-10-CM

## 2020-08-07 PROCEDURE — 98941 CHIROPRACT MANJ 3-4 REGIONS: CPT | Mod: AT | Performed by: CHIROPRACTOR

## 2020-08-07 PROCEDURE — 97035 APP MDLTY 1+ULTRASOUND EA 15: CPT | Performed by: CHIROPRACTOR

## 2020-08-07 PROCEDURE — 97530 THERAPEUTIC ACTIVITIES: CPT | Performed by: CHIROPRACTOR

## 2020-08-07 NOTE — PROGRESS NOTES
Visit #:  2    Subjective:  Pretty Parker is a 59 year old female who is seen in f/u up for:        Cervical segment dysfunction  Cervicalgia  Somatic dysfunction of lumbar region  Chronic bilateral low back pain without sciatica  Somatic dysfunction of sacral region  Sacral pain.     Since last visit on 07/24/2020  Pretty Parker reports:    Area of chief complaint:  Cervical, Thoracic and Lumbar :  Symptoms are graded at 3/10. The quality is described as stiff, achey, dull.  Motion has increased, but is still not normal. The pt reports about 70% improvement since the initial treatment. She is pleased with her progress. She states she has been in her garden this past week and has been lifting excessively. She notes tension at the base of the neck. She denies significant low back pain. The pt denies weakness or other unusual sx.  Patient feels that they are improved due to a reduction in symptoms.     Since last visit the patient feels that they are 70 percent  improved from last visit.       Objective:  The following was observed:    P: palpatory tenderness Lumbar erector spine and Quad lumb Bilaterally, B suboccipitals   A: static palpation demonstrates intersegmental asymmetry   R: motion palpation notes restricted motion  T: hypertonicity at: Lumbar erector spine and Quad lumb Bilaterally, suboccipitals     Segmental spinal dysfunction/restrictions found at:  C2 , C7 , T5 , T8 , L5 , Sacrum  and PSIS Right       Assessment:    Diagnoses:      1. Cervical segment dysfunction    2. Cervicalgia    3. Somatic dysfunction of lumbar region    4. Chronic bilateral low back pain without sciatica    5. Somatic dysfunction of sacral region    6. Sacral pain        Patient's condition:  Patient had restrictions pre-manipulation    Treatment effectiveness:  Post manipulation there is better intersegmental movement and Patient claims to feel looser post manipulation      Procedures:  CMT:  48797 Chiropractic manipulative  treatment 3-4 regions performed   Cervical: C2, C7 diversified prone, supine   Thoracic: Diversified, T1, T5, T8, Prone  Lumbar: Diversified, L5, Side posture  Pelvis: Drop Table, Sacrum , PSIS Right , Prone    Modalities:  US therapy: 1.6 loyola/cm2 to the cervical musculature, 1 MHZ 8 minutes     Therapeutic procedures:  Gave Eli  Cervical rotation with overpressure with demonstration as per protocol per 8 minutes   Gave levator scapula stretch with demonstration. Gave levator with rotation at differing angles with demonstration as per protocol.   Gave cervical flexion seated and supine with demonstration  Per 8 minutes     Response to Treatment  Reduction in symptoms     Prognosis: Excellent    Progress towards Goals: Patient is making progress towards the goal.   Goals:  SITTING: the patient specific goal is to attain pre-injury status of  8 hours comfortably  Transitional movements  Sleeping 6-8 hours uninterrupted       Recommendations:    Instructions:  continue to use lumbar support     Follow-up:    Return to care if sx persist

## 2020-08-13 ENCOUNTER — OFFICE VISIT (OUTPATIENT)
Dept: URGENT CARE | Facility: URGENT CARE | Age: 60
End: 2020-08-13
Payer: COMMERCIAL

## 2020-08-13 ENCOUNTER — RESULTS ONLY (OUTPATIENT)
Dept: LAB | Age: 60
End: 2020-08-13

## 2020-08-13 DIAGNOSIS — Z53.9 ERRONEOUS ENCOUNTER--DISREGARD: Primary | ICD-10-CM

## 2020-08-15 LAB
SARS-COV-2 RNA SPEC QL NAA+PROBE: NOT DETECTED
SPECIMEN SOURCE: NORMAL

## 2020-11-13 ENCOUNTER — THERAPY VISIT (OUTPATIENT)
Dept: CHIROPRACTIC MEDICINE | Facility: CLINIC | Age: 60
End: 2020-11-13
Payer: COMMERCIAL

## 2020-11-13 DIAGNOSIS — M54.50 CHRONIC BILATERAL LOW BACK PAIN WITHOUT SCIATICA: ICD-10-CM

## 2020-11-13 DIAGNOSIS — M99.01 CERVICAL SEGMENT DYSFUNCTION: Primary | ICD-10-CM

## 2020-11-13 DIAGNOSIS — M53.3 SACRAL PAIN: ICD-10-CM

## 2020-11-13 DIAGNOSIS — M99.03 SOMATIC DYSFUNCTION OF LUMBAR REGION: ICD-10-CM

## 2020-11-13 DIAGNOSIS — M54.2 CERVICALGIA: ICD-10-CM

## 2020-11-13 DIAGNOSIS — M99.04 SOMATIC DYSFUNCTION OF SACRAL REGION: ICD-10-CM

## 2020-11-13 DIAGNOSIS — G89.29 CHRONIC BILATERAL LOW BACK PAIN WITHOUT SCIATICA: ICD-10-CM

## 2020-11-13 PROCEDURE — 98941 CHIROPRACT MANJ 3-4 REGIONS: CPT | Mod: AT | Performed by: CHIROPRACTOR

## 2020-11-13 NOTE — PROGRESS NOTES
Visit #:  3    Subjective:  Pretty Parker is a 59 year old female who is seen in f/u up for:        Cervical segment dysfunction  Cervicalgia  Somatic dysfunction of lumbar region  Chronic bilateral low back pain without sciatica  Somatic dysfunction of sacral region  Sacral pain.      Since last visit on 08/07/2020  Pretty Parker reports:    Area of chief complaint:  Cervical, Thoracic and Lumbar :  Symptoms are graded at 4/10. The quality is described as stiff, achey, dull.  Motion has increased, but is still not normal. The pt reports about 60% improvement since the initial treatment. She is slightly sore today due to hiking and performing activities that may aggravate the spine such as sitting or standing for extended periods. She is having difficulty turning her head in either direction. The pt denies HA sx. Recently she notes R lower leg and foot pain. She saw a specialist recently to address the issues. The px seems to persist. The pain in the R foot may be aggravating the R hip and pelvic area.  The pt denies weakness or other unusual sx.  Patient feels that they are improved due to a reduction in symptoms.     Since last visit the patient feels that they are 60 percent  improved from last visit-slight exacerbation       Objective:  The following was observed:    P: palpatory tenderness Lumbar erector spine and Quad lumb Bilaterally, B suboccipitals   A: static palpation demonstrates intersegmental asymmetry   R: motion palpation notes restricted motion  T: hypertonicity at: Lumbar erector spine and Quad lumb Bilaterally, suboccipitals     Segmental spinal dysfunction/restrictions found at:  C2 , C7 , T4 , T6, T10, L4 , Sacrum  and PSIS Right       Assessment:    Diagnoses:      1. Cervical segment dysfunction    2. Cervicalgia    3. Somatic dysfunction of lumbar region    4. Chronic bilateral low back pain without sciatica    5. Somatic dysfunction of sacral region    6. Sacral pain        Patient's  condition:  Exacerbation/regression    Treatment effectiveness:  Post manipulation there is better intersegmental movement and Patient claims to feel looser post manipulation      Procedures:  CMT:  [id number] Chiropractic manipulative treatment 3-4 regions performed   Cervical: C2, C7 diversified prone, supine   Thoracic: Diversified, T1, T4, T 6. T10 Prone  Lumbar: Diversified, L4, Side posture  Pelvis: Drop Table, Sacrum , PSIS Right , Prone    Modalities: No     US today   US therapy: 1.6 loyola/cm2 to the cervical musculature, 1 MHZ 8 minutes     Therapeutic procedures:  No HEP today, continue with previously prescribed HEP     Response to Treatment  Reduction in symptoms     Prognosis: Excellent    Progress towards Goals: Patient is making progress towards the goal.   Goals:  SITTING: the patient specific goal is to attain pre-injury status of  8 hours comfortably  Transitional movements  Sleeping 6-8 hours uninterrupted       Recommendations:    Instructions:  continue to use lumbar support     Follow-up:  1 week with check-up   Return to care if sx persist in the feet and lower legs   40 minutes new evaluation

## 2020-11-30 ENCOUNTER — THERAPY VISIT (OUTPATIENT)
Dept: CHIROPRACTIC MEDICINE | Facility: CLINIC | Age: 60
End: 2020-11-30
Payer: COMMERCIAL

## 2020-11-30 DIAGNOSIS — M99.03 SOMATIC DYSFUNCTION OF LUMBAR REGION: ICD-10-CM

## 2020-11-30 DIAGNOSIS — M99.01 CERVICAL SEGMENT DYSFUNCTION: Primary | ICD-10-CM

## 2020-11-30 DIAGNOSIS — M54.50 CHRONIC BILATERAL LOW BACK PAIN WITHOUT SCIATICA: ICD-10-CM

## 2020-11-30 DIAGNOSIS — M99.06 SOMATIC DYSFUNCTION OF LOWER EXTREMITIES: ICD-10-CM

## 2020-11-30 DIAGNOSIS — M54.2 CERVICALGIA: ICD-10-CM

## 2020-11-30 DIAGNOSIS — G89.29 CHRONIC BILATERAL LOW BACK PAIN WITHOUT SCIATICA: ICD-10-CM

## 2020-11-30 DIAGNOSIS — M99.04 SOMATIC DYSFUNCTION OF SACRAL REGION: ICD-10-CM

## 2020-11-30 DIAGNOSIS — M53.3 SACRAL PAIN: ICD-10-CM

## 2020-11-30 DIAGNOSIS — M25.571 PAIN IN JOINT INVOLVING ANKLE AND FOOT, RIGHT: ICD-10-CM

## 2020-11-30 PROCEDURE — 99212 OFFICE O/P EST SF 10 MIN: CPT | Mod: 25 | Performed by: CHIROPRACTOR

## 2020-11-30 PROCEDURE — 98943 CHIROPRACT MANJ XTRSPINL 1/>: CPT | Mod: 51 | Performed by: CHIROPRACTOR

## 2020-11-30 PROCEDURE — 98941 CHIROPRACT MANJ 3-4 REGIONS: CPT | Mod: AT | Performed by: CHIROPRACTOR

## 2020-11-30 NOTE — PROGRESS NOTES
Visit #:  4  New Episode: R foot pain    Subjective:  Pretty Parker is a 60 year old female who is seen in f/u up for:        Cervical segment dysfunction  Cervicalgia  Chronic bilateral low back pain without sciatica  Sacral pain  Somatic dysfunction of lumbar region  Somatic dysfunction of sacral region  Somatic dysfunction of lower extremities  Pain in joint involving ankle and foot, right.      Since last visit on 11/13/2020  Pretty Parker reports:    Area of chief complaint:  Cervical, Thoracic and Lumbar :  Symptoms are graded at 3/10. The quality is described as stiff, achey, dull.  Motion has increased, but is still not normal. The pt reports about 80% improvement since the initial treatment. She notes significant improvement in the R neck area in addition to the low back. The pt continues to experience R low back pain with R dorsal foot pain when walking and standing. She has seem specialists for the issue.  The pt denies weakness or other unusual sx.     R foot pain: The pt reports R foot pain that started several years ago. She noted an increase in pain gradually over time. Standing for extended periods will increase the px. The pain is located on top of the R foot mostly when she is active. It seems to be affecting the R knee and hip as well. When she presses on the R foot joints, the pain will increase. The pt denies weakness or other unusual sx.      Patient feels that they are improved due to a reduction in symptoms in the spine and pelvis.     Since last visit the patient feels that they are 80 percent  improved from last visit-pt responding well, no change in R foot.        Objective:    R foot evaluation  Excessive R foot pronation when standing   Talar tilt test: negative  Anterior/posterior drawer sign: negative    Tenderness to palpation: R cuneiforms, R metatarsal heads 1-3  Hypomobility: R talus, R cuneiforms     Strength WNL  Heel and toe walk: WNL -no pain        The following was  observed:    P: palpatory tenderness Lumbar erector spine and Quad lumb Bilaterally, B suboccipitals   A: static palpation demonstrates intersegmental asymmetry   R: motion palpation notes restricted motion  T: hypertonicity at: Lumbar erector spine and Quad lumb Bilaterally, suboccipitals     Segmental spinal dysfunction/restrictions found at:  C2 , T2, T7, T12,  L4 , Sacrum  and PSIS Right       Assessment:    Diagnoses:      1. Cervical segment dysfunction    2. Cervicalgia    3. Chronic bilateral low back pain without sciatica    4. Sacral pain    5. Somatic dysfunction of lumbar region    6. Somatic dysfunction of sacral region    7. Somatic dysfunction of lower extremities    8. Pain in joint involving ankle and foot, right        Patient's condition:  Pt responding well to therapy     Treatment effectiveness:  Post manipulation there is better intersegmental movement and patient claims to feel looser post manipulation      Procedures:      CMT:  Evaluation: [id number] focused   [id number] Chiropractic manipulative treatment 3-4 regions performed   Cervical: C2 diversified prone, supine   Thoracic: Diversified, T2, T7, T12 Prone  Lumbar: Diversified, L4, Side posture  Pelvis: Drop Table, Sacrum , PSIS Right , Prone    Modalities: none     Therapeutic procedures:  No HEP today, continue with previously prescribed HEP     Response to Treatment  Reduction in symptoms     Prognosis: Excellent    Progress towards Goals: Patient is making progress towards the goal.   Goals:  SITTING: the patient specific goal is to attain pre-injury status of  8 hours comfortably  Transitional movements  Sleeping 6-8 hours uninterrupted   Hiking ans walking for several hours without R foot pain      Recommendations: get inserts for feet to address excessive pronation    Instructions:  continue to use lumbar support     Follow-up: 2 week with check-up

## 2020-12-14 ENCOUNTER — THERAPY VISIT (OUTPATIENT)
Dept: CHIROPRACTIC MEDICINE | Facility: CLINIC | Age: 60
End: 2020-12-14
Payer: COMMERCIAL

## 2020-12-14 DIAGNOSIS — M99.06 SOMATIC DYSFUNCTION OF LOWER EXTREMITIES: Primary | ICD-10-CM

## 2020-12-14 DIAGNOSIS — M25.571 PAIN IN JOINT INVOLVING ANKLE AND FOOT, RIGHT: ICD-10-CM

## 2020-12-14 PROCEDURE — 98943 CHIROPRACT MANJ XTRSPINL 1/>: CPT | Mod: 51 | Performed by: CHIROPRACTOR

## 2020-12-14 PROCEDURE — 97810 ACUP 1/> WO ESTIM 1ST 15 MIN: CPT | Mod: GA | Performed by: CHIROPRACTOR

## 2020-12-15 NOTE — PROGRESS NOTES
Visit #:  5  New Episode: R foot pain    Subjective:  Pretty Parker is a 60 year old female who is seen in f/u up for:        Somatic dysfunction of lower extremities  Pain in joint involving ankle and foot, right.      Since last visit on 11/30/2020  Pretty Parker reports:    Area of chief complaint:  Cervical, Thoracic and Lumbar :  Sx stable     R foot pain: The pt reports at least 50-60% improvement since initial presentation in the R foot. She is aware of her posture when standing and walking. She is using orthotics in her shoes to reduce fatigue in the R foot. The pt denies weakness or other unusual sx.      Patient feels that they are improved due to a reduction in symptoms in the R foot e    Since last visit the patient feels that they are over 50-60% improvement in the R foot.        Objective:    R foot evaluation  Excessive R foot pronation when standing   Talar tilt test: negative  Anterior/posterior drawer sign: negative    Tenderness to palpation: R cuneiforms, R metatarsal heads 1-3  Hypomobility: R talus, R cuneiforms     Strength WNL  Heel and toe walk: WNL -no pain        The following was observed:    P: palpatory tenderness Lumbar erector spine and Quad lumb Bilaterally, B suboccipitals   A: static palpation demonstrates intersegmental asymmetry   R: motion palpation notes restricted motion  T: hypertonicity at: Lumbar erector spine and Quad lumb Bilaterally, suboccipitals     Segmental spinal dysfunction/restrictions found at:  R talus, R calcaneous, R cuboid, R 1st cuneiform      Assessment:    Diagnoses:      1. Somatic dysfunction of lower extremities    2. Pain in joint involving ankle and foot, right        Patient's condition:  Pt responding well to therapy     Treatment effectiveness:  Post manipulation there is better intersegmental movement and patient claims to feel looser post manipulation      Procedures:      CMT:  R FOOT: [id number]:   Drop table, R talus, R cuboid, R calcaneous        Modalities:ACP: abeba points R ankle, B knee, ST 36, EYE OF THE KNEE GB 30   15 minutes supine    Therapeutic procedures:  No HEP today, continue with previously prescribed HEP     Response to Treatment  Reduction in symptoms     Prognosis: Excellent    Progress towards Goals: Patient is making progress towards the goal.   Goals:  SITTING: the patient specific goal is to attain pre-injury status of  8 hours comfortably  Transitional movements  Sleeping 6-8 hours uninterrupted   Hiking ans walking for several hours without R foot pain      Recommendations: get inserts for feet to address excessive pronation    Instructions:  continue to use lumbar support     Follow-up: 2 week with check-up

## 2021-05-30 VITALS — WEIGHT: 145 LBS | HEIGHT: 61 IN | BODY MASS INDEX: 27.38 KG/M2

## 2021-05-31 VITALS — HEIGHT: 61 IN | BODY MASS INDEX: 26.3 KG/M2 | WEIGHT: 139.31 LBS

## 2021-06-09 NOTE — PROGRESS NOTES
Chief Complaint   Patient presents with     Cough     Cold/Cough, fever, sore throat        HPI:    Patient is here for 6 days of productive cough, body aches, sore throat, headache, associated with fever up to 102. She was started on Doxycycline the last 2 days and Benzonatate without relief. No chest pain, shortness of breath, hx of asthma, nausea, vomiting.     ROS: Pertinent ROS noted in HPI.     Allergies   Allergen Reactions     Aspirin Rash     Penicillins Rash       There is no problem list on file for this patient.      No family history on file.    Social History     Social History     Marital status:      Spouse name: N/A     Number of children: N/A     Years of education: N/A     Occupational History     Not on file.     Social History Main Topics     Smoking status: Never Smoker     Smokeless tobacco: Never Used     Alcohol use Not on file     Drug use: Not on file     Sexual activity: Not on file     Other Topics Concern     Not on file     Social History Narrative     No narrative on file         Objective:    Vitals:    04/02/17 1137   BP: 100/60   Pulse: 100   Temp: 98.5  F (36.9  C)   SpO2: 96%       Gen:NAD  Oropharynx: normal  Ears: normal TMs and canals  Nose; no discharge  Neck: No adenopathy  CV: RRR, no M, R, G  Pulm: CTAB, normal effort    Recent Results (from the past 24 hour(s))   Rapid Strep A Screen-Throat   Result Value Ref Range    Rapid Strep A Antigen No Group A Strep detected No Group A Strep detected   Influenza A/B Rapid Test   Result Value Ref Range    Influenza  A, Rapid Antigen No Influenza A antigen detected No Influenza A antigen detected    Influenza B, Rapid Antigen No Influenza B antigen detected No Influenza B antigen detected       CXR - negative chest per my interpretation, discussed during visit.         Acute bronchitis  -     predniSONE (DELTASONE) 20 MG tablet; Take 2 tablets (40 mg total) by mouth daily for 3 days.    Cough  -     XR Chest PA and  Lateral    Sore throat  -     Rapid Strep A Screen-Throat  -     Group A Strep, RNA Direct Detection, Throat    Influenza-like symptoms  -     Influenza A/B Rapid Test

## 2021-06-13 NOTE — PROGRESS NOTES
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  This occurred since taking a new job and is very noisy.  There is mild pain with her ear although she attributed this to constantly pulling on it.  ROS: No fever chills nausea vomiting diarrhea or tinnitus.  Socially she does not smoke.  She has no other ENT symptoms.    FH: The Patient's family history is not on file.    Meds:  Pretty has a current medication list which includes the following prescription(s): benzonatate, doxycycline, phenylephrine, and proair hfa.    O:  /70  Pulse 80  Temp 98.3  F (36.8  C)  Resp 16  Ht 5' 0.5\" (1.537 m)  Wt 139 lb 5 oz (63.2 kg)  BMI 26.76 kg/m2  ENT examination shows TMs clear sclera noninjected pharynx not erythematous in the neck is supple    A/P:   1. Ear pain  - Ambulatory referral to Audiology                                          "

## 2021-09-07 ENCOUNTER — HOSPITAL ENCOUNTER (OUTPATIENT)
Dept: MAMMOGRAPHY | Facility: CLINIC | Age: 61
Discharge: HOME | End: 2021-09-07
Attending: FAMILY MEDICINE | Admitting: FAMILY MEDICINE
Payer: COMMERCIAL

## 2021-09-07 DIAGNOSIS — Z12.31 VISIT FOR SCREENING MAMMOGRAM: ICD-10-CM

## 2021-09-07 PROCEDURE — 77063 BREAST TOMOSYNTHESIS BI: CPT

## 2021-10-13 ENCOUNTER — TRANSFERRED RECORDS (OUTPATIENT)
Dept: HEALTH INFORMATION MANAGEMENT | Facility: CLINIC | Age: 61
End: 2021-10-13

## 2021-10-13 DIAGNOSIS — E78.5 HYPERLIPEMIA: Primary | ICD-10-CM

## 2021-10-14 ENCOUNTER — HOSPITAL ENCOUNTER (OUTPATIENT)
Dept: MAMMOGRAPHY | Facility: CLINIC | Age: 61
End: 2021-10-14
Attending: FAMILY MEDICINE
Payer: COMMERCIAL

## 2021-10-14 DIAGNOSIS — N64.52 BREAST DISCHARGE: ICD-10-CM

## 2021-10-14 DIAGNOSIS — N64.4 BREAST PAIN, RIGHT: ICD-10-CM

## 2021-10-14 PROCEDURE — 76642 ULTRASOUND BREAST LIMITED: CPT | Mod: RT

## 2021-10-27 ENCOUNTER — APPOINTMENT (OUTPATIENT)
Dept: GENERAL RADIOLOGY | Facility: CLINIC | Age: 61
End: 2021-10-27
Attending: EMERGENCY MEDICINE
Payer: COMMERCIAL

## 2021-10-27 ENCOUNTER — HOSPITAL ENCOUNTER (EMERGENCY)
Facility: CLINIC | Age: 61
Discharge: HOME | End: 2021-10-27
Attending: EMERGENCY MEDICINE | Admitting: EMERGENCY MEDICINE
Payer: COMMERCIAL

## 2021-10-27 VITALS
SYSTOLIC BLOOD PRESSURE: 134 MMHG | DIASTOLIC BLOOD PRESSURE: 84 MMHG | WEIGHT: 150 LBS | OXYGEN SATURATION: 98 % | HEIGHT: 61 IN | HEART RATE: 78 BPM | TEMPERATURE: 96.9 F | BODY MASS INDEX: 28.32 KG/M2 | RESPIRATION RATE: 15 BRPM

## 2021-10-27 DIAGNOSIS — R07.9 CHEST PAIN, UNSPECIFIED TYPE: ICD-10-CM

## 2021-10-27 LAB
ALBUMIN SERPL-MCNC: 4.1 G/DL (ref 3.4–5)
ALP SERPL-CCNC: 96 U/L (ref 40–150)
ALT SERPL W P-5'-P-CCNC: 33 U/L (ref 0–50)
ANION GAP SERPL CALCULATED.3IONS-SCNC: 5 MMOL/L (ref 3–14)
AST SERPL W P-5'-P-CCNC: 18 U/L (ref 0–45)
ATRIAL RATE - MUSE: 65 BPM
BASOPHILS # BLD AUTO: 0.1 10E3/UL (ref 0–0.2)
BASOPHILS NFR BLD AUTO: 1 %
BILIRUB SERPL-MCNC: 0.5 MG/DL (ref 0.2–1.3)
BUN SERPL-MCNC: 14 MG/DL (ref 7–30)
CALCIUM SERPL-MCNC: 9.2 MG/DL (ref 8.5–10.1)
CHLORIDE BLD-SCNC: 106 MMOL/L (ref 94–109)
CO2 SERPL-SCNC: 28 MMOL/L (ref 20–32)
CREAT SERPL-MCNC: 0.58 MG/DL (ref 0.52–1.04)
D DIMER PPP FEU-MCNC: <0.27 UG/ML FEU (ref 0–0.5)
DIASTOLIC BLOOD PRESSURE - MUSE: NORMAL MMHG
EOSINOPHIL # BLD AUTO: 0.2 10E3/UL (ref 0–0.7)
EOSINOPHIL NFR BLD AUTO: 3 %
ERYTHROCYTE [DISTWIDTH] IN BLOOD BY AUTOMATED COUNT: 13.7 % (ref 10–15)
GFR SERPL CREATININE-BSD FRML MDRD: >90 ML/MIN/1.73M2
GLUCOSE BLD-MCNC: 99 MG/DL (ref 70–99)
HCT VFR BLD AUTO: 46.5 % (ref 35–47)
HGB BLD-MCNC: 15.2 G/DL (ref 11.7–15.7)
HOLD SPECIMEN: NORMAL
IMM GRANULOCYTES # BLD: 0 10E3/UL
IMM GRANULOCYTES NFR BLD: 1 %
INTERPRETATION ECG - MUSE: NORMAL
LIPASE SERPL-CCNC: 83 U/L (ref 73–393)
LYMPHOCYTES # BLD AUTO: 2.4 10E3/UL (ref 0.8–5.3)
LYMPHOCYTES NFR BLD AUTO: 37 %
MCH RBC QN AUTO: 28.9 PG (ref 26.5–33)
MCHC RBC AUTO-ENTMCNC: 32.7 G/DL (ref 31.5–36.5)
MCV RBC AUTO: 88 FL (ref 78–100)
MONOCYTES # BLD AUTO: 0.5 10E3/UL (ref 0–1.3)
MONOCYTES NFR BLD AUTO: 7 %
NEUTROPHILS # BLD AUTO: 3.4 10E3/UL (ref 1.6–8.3)
NEUTROPHILS NFR BLD AUTO: 51 %
NRBC # BLD AUTO: 0 10E3/UL
NRBC BLD AUTO-RTO: 0 /100
P AXIS - MUSE: 58 DEGREES
PLATELET # BLD AUTO: 218 10E3/UL (ref 150–450)
POTASSIUM BLD-SCNC: 3.8 MMOL/L (ref 3.4–5.3)
PR INTERVAL - MUSE: 164 MS
PROT SERPL-MCNC: 7.7 G/DL (ref 6.8–8.8)
QRS DURATION - MUSE: 78 MS
QT - MUSE: 422 MS
QTC - MUSE: 438 MS
R AXIS - MUSE: 15 DEGREES
RBC # BLD AUTO: 5.26 10E6/UL (ref 3.8–5.2)
SODIUM SERPL-SCNC: 139 MMOL/L (ref 133–144)
SYSTOLIC BLOOD PRESSURE - MUSE: NORMAL MMHG
T AXIS - MUSE: 34 DEGREES
TROPONIN I SERPL-MCNC: <0.015 UG/L (ref 0–0.04)
VENTRICULAR RATE- MUSE: 65 BPM
WBC # BLD AUTO: 6.6 10E3/UL (ref 4–11)

## 2021-10-27 PROCEDURE — 84484 ASSAY OF TROPONIN QUANT: CPT | Performed by: EMERGENCY MEDICINE

## 2021-10-27 PROCEDURE — 85025 COMPLETE CBC W/AUTO DIFF WBC: CPT | Performed by: EMERGENCY MEDICINE

## 2021-10-27 PROCEDURE — 80053 COMPREHEN METABOLIC PANEL: CPT | Performed by: EMERGENCY MEDICINE

## 2021-10-27 PROCEDURE — 250N000013 HC RX MED GY IP 250 OP 250 PS 637: Performed by: EMERGENCY MEDICINE

## 2021-10-27 PROCEDURE — 83690 ASSAY OF LIPASE: CPT | Performed by: EMERGENCY MEDICINE

## 2021-10-27 PROCEDURE — 93005 ELECTROCARDIOGRAM TRACING: CPT

## 2021-10-27 PROCEDURE — 85379 FIBRIN DEGRADATION QUANT: CPT | Performed by: EMERGENCY MEDICINE

## 2021-10-27 PROCEDURE — 250N000009 HC RX 250: Performed by: EMERGENCY MEDICINE

## 2021-10-27 PROCEDURE — 71046 X-RAY EXAM CHEST 2 VIEWS: CPT

## 2021-10-27 PROCEDURE — 36415 COLL VENOUS BLD VENIPUNCTURE: CPT | Performed by: EMERGENCY MEDICINE

## 2021-10-27 PROCEDURE — 82040 ASSAY OF SERUM ALBUMIN: CPT | Performed by: EMERGENCY MEDICINE

## 2021-10-27 PROCEDURE — 99285 EMERGENCY DEPT VISIT HI MDM: CPT | Mod: 25

## 2021-10-27 RX ADMIN — LIDOCAINE HYDROCHLORIDE 30 ML: 20 SOLUTION ORAL; TOPICAL at 14:48

## 2021-10-27 ASSESSMENT — ENCOUNTER SYMPTOMS
ABDOMINAL PAIN: 1
BACK PAIN: 1
ARTHRALGIAS: 1
FEVER: 0

## 2021-10-27 ASSESSMENT — MIFFLIN-ST. JEOR: SCORE: 1192.78

## 2021-10-27 NOTE — ED TRIAGE NOTES
Started yesterday, front of chest radiates to back, burning sensation. Went away yesterday and then came back today

## 2021-10-27 NOTE — ED PROVIDER NOTES
"  History   Chief Complaint:  Chest Pain     The history is provided by the patient.      Pretty Parker is a 61 year old female with history of depression who presents with chest pain. Patient reports that yesterday, while she was sitting at her desk, she had onset of lower chest pain/upper abdominal pain that radiated into her back and up into her jaw. States it is a pressure sensation. Endorses it initially went away, but has came back today. Notes she has a history of GERD, but these symptoms are not similar. Recently traveled to [place] a month ago and [place] a week ago. She has a recent diagnosis of hyperlipidemia. No history of DVT or PE. No fever.     Review of Systems   Constitutional: Negative for fever.   Cardiovascular: Positive for chest pain.   Gastrointestinal: Positive for abdominal pain.   Musculoskeletal: Positive for arthralgias and back pain.   All other systems reviewed and are negative.    Allergies:  Piroxicam  Aspirin  Penicillins  Sulfa Drugs    Medications:  Lipitor    Past Medical History:     Depression    Past Surgical History:    Appendectomy  Low back surgery   Ulnar nerve decompression    Social History:  Presents with male associate    Physical Exam     Patient Vitals for the past 24 hrs:   BP Temp Pulse Resp SpO2 Height Weight   10/27/21 1500 134/84 -- 78 15 98 % -- --   10/27/21 1435 -- -- 78 -- -- -- --   10/27/21 1430 (!) 148/100 -- 78 16 100 % -- --   10/27/21 1425 (!) 151/90 -- -- -- -- -- --   10/27/21 1109 (!) 146/78 96.9  F (36.1  C) 75 20 100 % 1.549 m (5' 1\") 68 kg (150 lb)       Physical Exam  GENERAL: well developed, pleasant  HEAD: atraumatic  EYES: pupils reactive, extraocular muscles intact, conjunctivae normal  ENT:  mucus membranes moist  NECK:  trachea midline, normal range of motion  RESPIRATORY: no tachypnea, breath sounds clear to auscultation   CVS: normal S1/S2, no murmurs, intact distal pulses  ABDOMEN: soft, nontender, " nondistention  MUSCULOSKELETAL: no deformities  SKIN: warm and dry, no acute rashes or ulceration  NEURO: GCS 15, cranial nerves intact, alert and oriented x3  PSYCH:  Mood/affect normal    Emergency Department Course   ECG  ECG obtained at 1119, ECG read at 1121  NSR   Rate 65 bpm. MS interval 164 ms. QRS duration 78 ms. QT/QTc 422/438 ms. P-R-T axes 58 15 34.     Imaging:  XR Chest 2 Views   Final Result   IMPRESSION: Negative chest. Lungs clear. No pleural effusions.      ISAMAR MEJIAS MD            SYSTEM ID:  [id number]      Echo Stress Echocardiogram    (Results Pending)     Report per radiology    Laboratory:  CBC: WBC: 6.6, HGB: 15.2, PLT: 218  CMP: Glucose 99, o/w WNL (Creatinine: 0.58)  Troponin (Collected 1117): <0.015     Lipase: 83  D dimer: <0.27    Procedures  None    Emergency Department Course:  Reviewed:  I reviewed nursing notes, vitals, past medical history and Care Everywhere    Assessments:  1432 I obtained history and examined the patient as noted above.   1551 I rechecked the patient and explained findings.     Interventions:  1448 Xylocaine/Maalox, 30 mL, Oral     Disposition:  The patient was discharged to home.     Impression & Plan   Medical Decision Making:    Patient presents with chest pain that lower chest as well as upper abdomen that was on set yesterday but fairly persistent all day yesterday and then again today.  Patient does have a history of GERD but she feels this feels different.  She did exercise a week ago and had no exertional symptoms at that time.  Her heart score is low.  EKG and troponin D-dimer x-ray lipase LFTs are all normal.  Patient given GI cocktail with some improvement in symptoms.  Patient looks safe for discharge we will get her set up for an outpatient stress test tomorrow or the next day.  Discussed with her if symptoms worsen she can return.  Certainly unstable angina or acute coronary syndrome are considered versus GERD biliary colic PE dissection  pneumothorax amongst others.    Diagnosis:    ICD-10-CM    1. Chest pain, unspecified type  R07.9 Echo Stress Echocardiogram     Scribe Disclosure:  I, Jackson Cortes, am serving as a scribe at 2:03 PM on 10/27/2021 to document services personally performed by Kolton Brambila MD based on my observations and the provider's statements to me.            Kolton Brambila MD  10/27/21 2038

## 2021-11-19 ENCOUNTER — IMMUNIZATION (OUTPATIENT)
Dept: NURSING | Facility: CLINIC | Age: 61
End: 2021-11-19
Payer: COMMERCIAL

## 2021-11-19 PROCEDURE — 0004A PR COVID VAC PFIZER DIL RECON 30 MCG/0.3 ML IM: CPT

## 2021-11-19 PROCEDURE — 91300 PR COVID VAC PFIZER DIL RECON 30 MCG/0.3 ML IM: CPT

## 2022-03-12 ENCOUNTER — HEALTH MAINTENANCE LETTER (OUTPATIENT)
Age: 62
End: 2022-03-12

## 2022-05-10 ENCOUNTER — ALLIED HEALTH/NURSE VISIT (OUTPATIENT)
Dept: FAMILY MEDICINE | Facility: CLINIC | Age: 62
End: 2022-05-10
Payer: COMMERCIAL

## 2022-05-10 DIAGNOSIS — Z23 NEED FOR VACCINATION: Primary | ICD-10-CM

## 2022-05-10 PROCEDURE — 90471 IMMUNIZATION ADMIN: CPT

## 2022-05-10 PROCEDURE — 90750 HZV VACC RECOMBINANT IM: CPT

## 2022-06-18 NOTE — LETTER
9/25/2019         RE: Pretty Parker  [address] [facility] [address]        Dear Colleague,    Thank you for referring your patient, Pretty Parker, to the [facility] [place]. Please see a copy of my visit note below.    [facility] - PRIMARY CARE SKIN    CC: suture   SUBJECTIVE:   Pretty Parker is a(n) 59 year old female who presents to clinic today because of feeling of something sticking out. She had a excision of epidermoid cyst on 08/19/2019.         Refer to electronic medical record (EMR) for past medical history and medications.      ROS: 14 point review of systems was negative except the symptoms listed above in the HPI.        OBJECTIVE:   GENERAL: healthy, alert and no distress.  SKIN: Jimenez Skin Type - II.  Trunk examined. The dermatoscope was used to help evaluate pigmented lesions.  Skin Pertinent Findings:  Left lateral upper back - well healed, protruding internal stitch, Clipped with iris scrissors  Diagnostic Test Results:  none     ASSESSMENT:     Encounter Diagnosis   Name Primary?     Suture reaction, initial encounter Yes       PLAN:   Patient Instructions   Recheck if needed      TT: 20 minutes.  CT: 15 minutes.    The information in this document, created by the medical scribe for me, accurately reflects the services I personally performed and the decisions made by me. I have reviewed and approved this document for accuracy prior to leaving the patient care area.  September 25, 2019 10:41 AM  Franny North MD  [facility]      Again, thank you for allowing me to participate in the care of your patient.        Sincerely,        Franny North MD     DELIVERY ROOM NOTE    Girl Milagro Daniels) Patient Status:  Mobile    2022 MRN [id number]   [facility] 1NW-N Attending Andra Polanco,*   Hosp Day # 0 PCP No primary care provider on file. Date of Delivery: 2022  Time of Delivery: 8:48 AM  Delivery Type: Caesarean Section    Maternal Information:  Information for the patient's mother: Walter Horton [[id number]]  32year old  Information for the patient's mother: Walter Horton [[id number]]      Pertinent Maternal Prenatal Labs:   Mother's Information  Mother: Walter Horton #[id number]   Start of Mother's Information    Prenatal Results    Initial Prenatal Labs ([facility] 0-32T)     Test Value Date Time    ABO Grouping OB  O  22 0632    RH Factor OB  Positive  22 0632    Antibody Screen OB  Negative  21 0811    Rubella Titer OB  Positive  21 0811    Hep B Surf Ag OB  Nonreactive   21 0811    Serology (RPR) OB       TREP       TREP Qual  Nonreactive   21 0811    T pallidum Antibodies       HIV Result OB       HIV Combo Result       5th Gen HIV - DMG  Nonreactive   21 0811    HGB  12.9 g/dL 21 0811    HCT  37.7 % 21 0811    MCV  94.3 fL 21 0811    Platelets  818 47^4/VN 21 0811    Urine Culture       Chlamydia with Pap  NOT DETECTED  02/15/22 1048    GC with Pap  NOT DETECTED  02/15/22 1048    Chlamydia       GC       Pap Smear       Sickel Cell Solubility HGB       HPV       HCV         2nd Trimester Labs (GA 24-w)     Test Value Date Time    Antibody Screen OB  Negative  22 0632    Serology (RPR) OB       HGB  11.3 g/dL 22 0632       11.4 g/dL 22 1324    HCT  33.5 % 22 0632       35.1 % 22 1324    Glucose 1 hour  94 mg/dL 22 1324    Glucose Ernst 3 hr Gestational Fasting       1 Hour glucose       2 Hour glucose       3 Hour glucose         3rd Trimester Labs (GA 24-41w)     Test Value Date Time    Antibody Screen OB Negative  22 0632    Group B Strep OB ^ Positive  22     Group B Strep Culture       GBS - DMG       HGB  11.3 g/dL 22 0632    HCT  33.5 % 22 0632    HIV Result OB ^ Negative  22     HIV Combo Result       5th Gen HIV - DMG  Nonreactive   22 1324    TREP       T pallidum Antibodies       COVID19 Infection  Not Detected  22 0393      First Trimester & Genetic Testing (GA 0-40w)     Test Value Date Time    MaternaT-21 (T13)       MaternaT-21 (T18)       MaternaT-21 (T21)       VISIBILI T (T21)       VISIBILI T (T18)       Cystic Fibrosis Screen [32]       Cystic Fibrosis Screen [165]       Cystic Fibrosis Screen [165]       Cystic Fibrosis Screen [165]       Cystic Fibrosis Screen [165]       CVS       Counsyl [T13]       Counsyl [T18]       Counsyl [T21]         Genetic Screening (GA 0-45w)     Test Value Date Time    AFP Tetra-Patient's HCG       AFP Tetra-Mom for HCG       AFP Tetra-Patient's UE3       AFP Tetra-Mom for UE3       AFP Tetra-Patient's KANU       AFP Tetra-Mom for KANU       AFP Tetra-Patient's AFP       AFP Tetra-Mom for AFP       AFP, Spina Bifida       Quad Screen ([facility])  Comment  22 0846    AFP       AFP, Tetra       AFP, Serum         Legend    ^: Historical              End of Mother's Information  Mother: Gillian Winter #[id number]              Pregnancy/ Complications: Neonatologist attended this delivery for primary C/S for breech    Rupture Date: 2022  Rupture Time: 8:48 AM  Rupture Type: AROM  Fluid Color:    Induction: None  Augmentation: None  Complications:      Apgars:   1 minute: 8                5 minutes:9                          10 minutes:     Resuscitation: Delayed cord clamping done X30 seconds. Infant vigorous at birth receiving routine drying/stimulation. Infant bulb and then DeLee suctioned (mouth and nares) for large amount of clear secretions.    Infant was not pinking up on her own with crying so given brief BBO2 with improvement. Infant remained pink after removal of BBO2. Physical Exam:  Birth Weight: Weight: 3570 g (7 lb 13.9 oz) (Filed from Delivery Summary)    Gen:  Awake, alert, active  HEENT:  NCAT, AFOSF, eyes clear, neck supple, ears normal position b/l, palate intact, nares appear patent b/l  Lungs:    CTA bilaterally, equal air entry, no increased WOB  Chest:  RRR, normal S1/S2, no murmur  Abd:  Soft, nontender, nondistended, + bowel sounds, no HSM, no masses  Ext:  No hip clicks/clunks, b/l positional foot deformities  Neuro:  +grasp, +suck, +daniella, good tone, no focal deficits  Spine:  No sacral dimples, no dinora noted  :  Normal female   Skin:  No rashes/lesion        Assessment:  Clinically well appearing term female infant born breech. Recommendation:  Routine  nursery care. Monitor hips per AAP guidelines.       Deepa Jaramillo MD

## 2022-06-30 DIAGNOSIS — R00.2 PALPITATIONS: Primary | ICD-10-CM

## 2022-06-30 PROCEDURE — 93000 ELECTROCARDIOGRAM COMPLETE: CPT | Performed by: PHYSICIAN ASSISTANT

## 2022-07-01 ENCOUNTER — ANCILLARY PROCEDURE (OUTPATIENT)
Dept: GENERAL RADIOLOGY | Facility: CLINIC | Age: 62
End: 2022-07-01
Attending: PHYSICIAN ASSISTANT
Payer: COMMERCIAL

## 2022-07-01 ENCOUNTER — OFFICE VISIT (OUTPATIENT)
Dept: FAMILY MEDICINE | Facility: CLINIC | Age: 62
End: 2022-07-01
Payer: COMMERCIAL

## 2022-07-01 VITALS
HEIGHT: 61 IN | SYSTOLIC BLOOD PRESSURE: 130 MMHG | HEART RATE: 85 BPM | OXYGEN SATURATION: 97 % | DIASTOLIC BLOOD PRESSURE: 84 MMHG | BODY MASS INDEX: 28.34 KG/M2

## 2022-07-01 DIAGNOSIS — R06.00 DYSPNEA, UNSPECIFIED TYPE: ICD-10-CM

## 2022-07-01 DIAGNOSIS — R07.1 CHEST PAIN ON BREATHING: Primary | ICD-10-CM

## 2022-07-01 PROCEDURE — 71046 X-RAY EXAM CHEST 2 VIEWS: CPT | Mod: TC | Performed by: RADIOLOGY

## 2022-07-01 PROCEDURE — 99214 OFFICE O/P EST MOD 30 MIN: CPT | Performed by: PHYSICIAN ASSISTANT

## 2022-07-01 PROCEDURE — 93000 ELECTROCARDIOGRAM COMPLETE: CPT | Performed by: PHYSICIAN ASSISTANT

## 2022-07-01 NOTE — PROGRESS NOTES
"  Assessment & Plan     Chest pain on breathing  Symptoms could be caused by deconditioning and may also represent long covid. However, due to dyspnea on exertion and exercise fatigue, occasional vague CP, I have advised that she undergo stress echo at this time for appropriate workup.  She is agreeable  - EKG 12-lead complete w/read - Clinics    Dyspnea, unspecified type  Chest x ray clear today  - XR Chest 2 Views; Future  - Echocardiogram Exercise Stress; Future         BMI:   Estimated body mass index is 28.34 kg/m  as calculated from the following:    Height as of this encounter: 1.549 m (5' 1\").    Weight as of 10/27/21: 68 kg (150 lb).       Return in about 2 weeks (around 7/15/2022) for with stress echo results.    Yordy Ramos PA-C  [facility]   Pretty is a 61 year old, presenting for the following health issues:  Palpitations (EKG was performed on 6/30/2022 per Karlee KINCAID)      History of Present Illness       Reason for visit:  Short in breathing  Symptom onset:  More than a month    She eats 2-3 servings of fruits and vegetables daily.She consumes 2 sweetened beverage(s) daily.She exercises with enough effort to increase her heart rate 30 to 60 minutes per day.  She exercises with enough effort to increase her heart rate 4 days per week.   She is taking medications regularly.       Pretty is a 60 y/o female with 4 month hx of dyspnea with exertion and easy fatigue especially with exercise.  Of note, she had COVID in January.  Symptoms came on following COVID diagnosis.  Yesterday, she developed palpitations while at work.  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 She is no longer having palpitations today.  She was seen in the ER 1 year ago for chest pain.  Did not follow up with stress test. She has occasional vague chest tightness with exertion  No family hx of early CAD, no smoking or DM.BP is controlled.     Review of Systems " "  Constitutional, HEENT, cardiovascular, pulmonary, GI, , musculoskeletal, neuro, skin, endocrine and psych systems are negative, except as otherwise noted.      Objective    /84   Pulse 85   Ht 1.549 m (5' 1\")   SpO2 97%   BMI 28.34 kg/m    Body mass index is 28.34 kg/m .  Physical Exam   GENERAL: healthy, alert and no distress  RESP: lungs clear to auscultation - no rales, rhonchi or wheezes  CV: regular rate and rhythm, normal S1 S2, no S3 or S4, no murmur, click or rub, no peripheral edema and peripheral pulses strong  PSYCH: mentation appears normal, affect normal/bright    EKG - from yesterday:  Reviewed and interpreted by me appears normal, NSR, normal axis, normal intervals, no acute ST/T changes c/w ischemia, no LVH by voltage criteria, unchanged from previous tracings                .  ..  "

## 2022-07-02 NOTE — RESULT ENCOUNTER NOTE
Pretty-      It was nice to see you today.  Your chest x ray is normal at this time.   I will follow up with you when I have the results of your stress echocardiogram.      Let me know if you have questions!  Yordy Ramos PA-C

## 2022-07-14 ENCOUNTER — TELEPHONE (OUTPATIENT)
Dept: CARDIOLOGY | Facility: CLINIC | Age: 62
End: 2022-07-14

## 2022-07-14 DIAGNOSIS — S05.02XA ABRASION OF LEFT CONJUNCTIVA, INITIAL ENCOUNTER: Primary | ICD-10-CM

## 2022-07-14 NOTE — TELEPHONE ENCOUNTER
[facility] Call Center    Phone Message    May a detailed message be left on voicemail: yes     Reason for Call: Other: Please call patient back to change the time of her echo stress test.      Action Taken: Other: Cardiology    Travel Screening: Not Applicable

## 2022-07-21 ENCOUNTER — ALLIED HEALTH/NURSE VISIT (OUTPATIENT)
Dept: FAMILY MEDICINE | Facility: CLINIC | Age: 62
End: 2022-07-21
Payer: COMMERCIAL

## 2022-07-21 DIAGNOSIS — Z23 NEED FOR VACCINATION: Primary | ICD-10-CM

## 2022-07-21 PROCEDURE — 99207 PR NO CHARGE NURSE ONLY: CPT

## 2022-07-21 PROCEDURE — 90750 HZV VACC RECOMBINANT IM: CPT

## 2022-07-21 PROCEDURE — 90471 IMMUNIZATION ADMIN: CPT

## 2022-08-01 DIAGNOSIS — K21.00 GASTROESOPHAGEAL REFLUX DISEASE WITH ESOPHAGITIS, UNSPECIFIED WHETHER HEMORRHAGE: Primary | ICD-10-CM

## 2022-08-04 ENCOUNTER — MYC MEDICAL ADVICE (OUTPATIENT)
Dept: FAMILY MEDICINE | Facility: CLINIC | Age: 62
End: 2022-08-04

## 2022-08-05 ENCOUNTER — LAB REQUISITION (OUTPATIENT)
Dept: LAB | Facility: CLINIC | Age: 62
End: 2022-08-05

## 2022-08-05 DIAGNOSIS — Z00.01 ENCOUNTER FOR GENERAL ADULT MEDICAL EXAMINATION WITH ABNORMAL FINDINGS: ICD-10-CM

## 2022-08-05 PROCEDURE — U0003 INFECTIOUS AGENT DETECTION BY NUCLEIC ACID (DNA OR RNA); SEVERE ACUTE RESPIRATORY SYNDROME CORONAVIRUS 2 (SARS-COV-2) (CORONAVIRUS DISEASE [COVID-19]), AMPLIFIED PROBE TECHNIQUE, MAKING USE OF HIGH THROUGHPUT TECHNOLOGIES AS DESCRIBED BY CMS-2020-01-R: HCPCS | Performed by: INTERNAL MEDICINE

## 2022-08-07 ENCOUNTER — OFFICE VISIT (OUTPATIENT)
Dept: URGENT CARE | Facility: URGENT CARE | Age: 62
End: 2022-08-07
Payer: COMMERCIAL

## 2022-08-07 VITALS
HEART RATE: 91 BPM | RESPIRATION RATE: 16 BRPM | TEMPERATURE: 97.5 F | WEIGHT: 150 LBS | BODY MASS INDEX: 28.34 KG/M2 | SYSTOLIC BLOOD PRESSURE: 133 MMHG | DIASTOLIC BLOOD PRESSURE: 84 MMHG | OXYGEN SATURATION: 100 %

## 2022-08-07 DIAGNOSIS — R07.0 THROAT PAIN: ICD-10-CM

## 2022-08-07 DIAGNOSIS — R05.9 COUGH: Primary | ICD-10-CM

## 2022-08-07 LAB
DEPRECATED S PYO AG THROAT QL EIA: NEGATIVE
GROUP A STREP BY PCR: NOT DETECTED
SARS-COV-2 RNA RESP QL NAA+PROBE: NEGATIVE

## 2022-08-07 PROCEDURE — 99213 OFFICE O/P EST LOW 20 MIN: CPT | Performed by: FAMILY MEDICINE

## 2022-08-07 PROCEDURE — 87651 STREP A DNA AMP PROBE: CPT | Performed by: FAMILY MEDICINE

## 2022-08-07 RX ORDER — BENZONATATE 100 MG/1
100 CAPSULE ORAL 3 TIMES DAILY PRN
Qty: 30 CAPSULE | Refills: 0 | Status: SHIPPED | OUTPATIENT
Start: 2022-08-07 | End: 2022-08-17

## 2022-08-07 RX ORDER — ATORVASTATIN CALCIUM 40 MG/1
TABLET, FILM COATED ORAL
COMMUNITY
Start: 2021-10-13 | End: 2022-12-15

## 2022-08-07 NOTE — PROGRESS NOTES
Assessment & Plan     Throat pain     - Streptococcus A Rapid Screen w/Reflex to PCR  - Group A Streptococcus PCR Throat Swab    Cough     - benzonatate (TESSALON) 100 MG capsule  Dispense: 30 capsule; Refill: 0     # Upper Respiratory Infection: Symptoms for 3 days. Has had, tested if returned negative. Patients noting the cough is persisting but overall symptoms improving. Vital signs are within normal limits today. Exam negative. Likely she is recovering from the upper respiratory infection of a viral nature. Strep test was negative today with culture pending. Given this will treat symptomatically and follow-up if not improving.  Testing Findings: strep test negative  Treatment: will treat symptomatically  JobSchool: as tolerated  Medications: tessalon pearls sent to the pharmacy. Limited tylenol/ibuprofen for pain for 1-2 weeks   Follow-up: In 1-2 weeks if symptoms do not improve with primary care provider, sooner if worsening  Can consider repeat evaluation         Return if symptoms worsen or fail to improve.    Gutierrez Osborne MD  [facility] is a 62 year old female who presents to clinic today for the following health issues:  Chief Complaint   Patient presents with     Cough     Cough and sore throat X 3 days. Pt had a neg PCR covid test that came back negative this morning      HPI       URI Adult    Onset of symptoms was 3 day(s) ago.  Course of illness is improving but cough is persisting, losing voice, chest sore from coughing  Severity mild  Current and Associated symptoms: cough - non-productive  Treatment measures tried include ibuprofen.  Predisposing factors include None.   patient services at   Had a neg covid PCR      Review of Systems  Constitutional, HEENT, cardiovascular, pulmonary, gi and gu systems are negative, except as otherwise noted.      Objective    /84   Pulse 91   Temp 97.5  F (36.4  C) (Tympanic)   Resp 16   Wt 68 kg  (150 lb)   SpO2 100%   BMI 28.34 kg/m    Physical Exam  Vitals and nursing note reviewed.   Constitutional:       General: She is not in acute distress.     Appearance: She is well-developed. She is not diaphoretic.   HENT:      Head: Normocephalic and atraumatic.      Right Ear: Tympanic membrane and external ear normal.      Left Ear: Tympanic membrane and external ear normal.      Nose: Nose normal.      Mouth/Throat:      Pharynx: Oropharynx is clear. No oropharyngeal exudate.   Eyes:      Pupils: Pupils are equal, round, and reactive to light.   Cardiovascular:      Rate and Rhythm: Normal rate and regular rhythm.      Heart sounds: Normal heart sounds. No murmur heard.    No friction rub. No gallop.   Pulmonary:      Effort: Pulmonary effort is normal. No respiratory distress.      Breath sounds: Normal breath sounds. No wheezing or rales.   Abdominal:      General: Bowel sounds are normal. There is no distension.      Palpations: Abdomen is soft.      Tenderness: There is no abdominal tenderness. There is no guarding.   Lymphadenopathy:      Cervical: No cervical adenopathy.   Skin:     General: Skin is warm and dry.      Findings: No rash.   Neurological:      Mental Status: She is alert and oriented to person, place, and time.           strep negative, culture pending

## 2022-08-07 NOTE — PATIENT INSTRUCTIONS
# Upper Respiratory Infection: Symptoms for 3 days. Has had, tested if returned negative. Patients noting the cough is persisting but overall symptoms improving. Vital signs are within normal limits today. Exam negative. Likely she is recovering from the upper respiratory infection of a viral nature. Strep test was negative today with culture pending. Given this will treat symptomatically and follow-up if not improving.  Testing Findings: strep test negative  Treatment: will treat symptomatically  JobSchool: as tolerated  Medications: tessalon pearls sent to the pharmacy. Limited tylenol/ibuprofen for pain for 1-2 weeks   Follow-up: In 1-2 weeks if symptoms do not improve with primary care provider, sooner if worsening  Can consider repeat evaluation    If you have not yet received the influenza vaccine but would like to get one, please call  [phone] or you can schedule via [employer]    It was great seeing you today!    Gutierrez Osborne MD, CAQSM

## 2022-09-22 ENCOUNTER — IMMUNIZATION (OUTPATIENT)
Dept: FAMILY MEDICINE | Facility: CLINIC | Age: 62
End: 2022-09-22
Payer: COMMERCIAL

## 2022-09-22 DIAGNOSIS — Z23 HIGH PRIORITY FOR 2019-NCOV VACCINE: Primary | ICD-10-CM

## 2022-09-22 PROCEDURE — 91312 COVID-19,PF,PFIZER BOOSTER BIVALENT: CPT

## 2022-09-22 PROCEDURE — 0124A COVID-19,PF,PFIZER BOOSTER BIVALENT: CPT

## 2022-11-10 ENCOUNTER — OFFICE VISIT (OUTPATIENT)
Dept: URGENT CARE | Facility: URGENT CARE | Age: 62
End: 2022-11-10
Payer: COMMERCIAL

## 2022-11-10 VITALS
BODY MASS INDEX: 28.34 KG/M2 | DIASTOLIC BLOOD PRESSURE: 78 MMHG | OXYGEN SATURATION: 95 % | HEART RATE: 98 BPM | SYSTOLIC BLOOD PRESSURE: 130 MMHG | TEMPERATURE: 98.3 F | WEIGHT: 150 LBS | RESPIRATION RATE: 16 BRPM

## 2022-11-10 DIAGNOSIS — J06.9 UPPER RESPIRATORY TRACT INFECTION, UNSPECIFIED TYPE: ICD-10-CM

## 2022-11-10 DIAGNOSIS — R50.9 FEVER AND CHILLS: Primary | ICD-10-CM

## 2022-11-10 LAB
FLUAV AG SPEC QL IA: NEGATIVE
FLUBV AG SPEC QL IA: NEGATIVE
SARS-COV-2 RNA RESP QL NAA+PROBE: NEGATIVE

## 2022-11-10 PROCEDURE — 99213 OFFICE O/P EST LOW 20 MIN: CPT | Mod: CS | Performed by: PHYSICIAN ASSISTANT

## 2022-11-10 PROCEDURE — U0005 INFEC AGEN DETEC AMPLI PROBE: HCPCS | Performed by: PHYSICIAN ASSISTANT

## 2022-11-10 PROCEDURE — U0003 INFECTIOUS AGENT DETECTION BY NUCLEIC ACID (DNA OR RNA); SEVERE ACUTE RESPIRATORY SYNDROME CORONAVIRUS 2 (SARS-COV-2) (CORONAVIRUS DISEASE [COVID-19]), AMPLIFIED PROBE TECHNIQUE, MAKING USE OF HIGH THROUGHPUT TECHNOLOGIES AS DESCRIBED BY CMS-2020-01-R: HCPCS | Performed by: PHYSICIAN ASSISTANT

## 2022-11-10 PROCEDURE — 87804 INFLUENZA ASSAY W/OPTIC: CPT | Performed by: PHYSICIAN ASSISTANT

## 2022-11-10 RX ORDER — BENZONATATE 200 MG/1
200 CAPSULE ORAL 3 TIMES DAILY PRN
Qty: 30 CAPSULE | Refills: 1 | Status: SHIPPED | OUTPATIENT
Start: 2022-11-10 | End: 2022-12-14

## 2022-11-10 NOTE — PROGRESS NOTES
Assessment & Plan     Fever and chills    - Influenza A/B antigen  - Symptomatic; Yes; 11/10/2022 COVID-19 Virus (Coronavirus) by PCR Nose    Upper respiratory tract infection, unspecified type  Patient was seen for 2-day history of URI symptoms.  She is vitally normal and her exam is reassuring.  Her influenza test is negative.  Await COVID-19 PCR.  She may return to work when fever free for 24 hours.  Refill Tessalon Perles.  Discussed continued conservative treatment including fluids rest and ibuprofen or Tylenol as needed for comfort she may follow-up on an as-needed basis.  - benzonatate (TESSALON) 200 MG capsule  Dispense: 30 capsule; Refill: 1       Joan Choudhury PA-C  [facility] PADMAJA Olsen is a 62 year old female who presents to clinic today for the following health issues:  Chief Complaint   Patient presents with     Fever     Cough, fever, and body aches X 2 days      HPI  2 day hx of uri sx, 101 temp.    Neg covid 19 at home.    No flu shot yet .    No sob, difficulty breathing.    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          Review of Systems  Constitutional, HEENT, cardiovascular, pulmonary, gi and gu systems are negative, except as otherwise noted.      Objective    /78   Pulse 98   Temp 98.3  F (36.8  C) (Tympanic)   Resp 16   Wt 68 kg (150 lb)   SpO2 95%   BMI 28.34 kg/m    Physical Exam   Pt is in no acute distress and appears well  Ears patent B:  TM s intact, non-injected. All land marks easily visibile    Nasal mucosa is non-edematous, no discharge.    Pharynx: non erythematous, tonsils non hypertrophied, No exudate   Neck supple: no adenopathy  Lungs: CTA  Heart: RRR, no murmur, no thrills or heaves   Ext: no edema  Skin: no rashes    Results for orders placed or performed in visit on 11/10/22   Influenza A/B antigen     Status: Normal    Specimen: Nose; Swab   Result Value Ref Range     Influenza A antigen Negative Negative    Influenza B antigen Negative Negative    Narrative    Test results must be correlated with clinical data. If necessary, results should be confirmed by a molecular assay or viral culture.

## 2022-11-10 NOTE — LETTER
[facility]RG[facility]ARE [address] [address]  Phone: [phone]    November 10, 2022        Pretty Parker  [address] [facility] [address]          To whom it may concern:    RE: Pretty Olsen was seen due to illness today and unable to return to work until fever free for 24 hours thus unable to attend work tomorrow 11-11-22      Please contact me for questions or concerns.      Sincerely,        Joan Choudhury PA-C

## 2022-11-15 ENCOUNTER — TELEPHONE (OUTPATIENT)
Dept: URGENT CARE | Facility: URGENT CARE | Age: 62
End: 2022-11-15

## 2022-11-15 NOTE — TELEPHONE ENCOUNTER
Pretty needs a letter from a provider to submit to [facility] stating that she was sick and we are waiting for her to be able to get her flu shot until after being sick. Pretty was seen in UC for illness.

## 2022-11-15 NOTE — LETTER
[facility]  [address] [address]  [phone]      November 16, 2022    RE:  Pretty Parker                                                                                                                                                       [address] [facility] [address]            To whom it may concern:    Pretty Parker was seen in clinic for a febrile respiratory illness on 11/10/22.        Sincerely,        Farida Wells PA-C    [facility]gent [facility]

## 2022-11-16 NOTE — TELEPHONE ENCOUNTER
Please contact patient to let her know that a letter is in her chart.      I can only state that she was seen for a febrile illness on that date.     Farida Wells PA-C

## 2022-11-17 ENCOUNTER — IMMUNIZATION (OUTPATIENT)
Dept: NURSING | Facility: CLINIC | Age: 62
End: 2022-11-17
Payer: COMMERCIAL

## 2022-11-17 PROCEDURE — 90471 IMMUNIZATION ADMIN: CPT

## 2022-11-17 PROCEDURE — 90682 RIV4 VACC RECOMBINANT DNA IM: CPT

## 2022-11-30 ENCOUNTER — TRANSFERRED RECORDS (OUTPATIENT)
Dept: HEALTH INFORMATION MANAGEMENT | Facility: CLINIC | Age: 62
End: 2022-11-30

## 2022-12-03 ENCOUNTER — TRANSFERRED RECORDS (OUTPATIENT)
Dept: HEALTH INFORMATION MANAGEMENT | Facility: CLINIC | Age: 62
End: 2022-12-03

## 2022-12-14 PROBLEM — M53.3 SACRAL PAIN: Status: RESOLVED | Noted: 2019-11-25 | Resolved: 2022-12-14

## 2022-12-14 PROBLEM — K21.9 ACID REFLUX DISEASE: Status: ACTIVE | Noted: 2022-12-14

## 2022-12-14 PROBLEM — M99.06 SOMATIC DYSFUNCTION OF LOWER EXTREMITIES: Status: RESOLVED | Noted: 2020-11-30 | Resolved: 2022-12-14

## 2022-12-14 PROBLEM — M99.03 SOMATIC DYSFUNCTION OF LUMBAR REGION: Status: RESOLVED | Noted: 2019-11-25 | Resolved: 2022-12-14

## 2022-12-14 PROBLEM — M25.571 PAIN IN JOINT INVOLVING ANKLE AND FOOT, RIGHT: Status: RESOLVED | Noted: 2020-11-30 | Resolved: 2022-12-14

## 2022-12-14 PROBLEM — M54.2 CERVICALGIA: Status: RESOLVED | Noted: 2019-11-25 | Resolved: 2022-12-14

## 2022-12-14 PROBLEM — G89.29 CHRONIC BILATERAL LOW BACK PAIN WITHOUT SCIATICA: Status: RESOLVED | Noted: 2019-11-25 | Resolved: 2022-12-14

## 2022-12-14 PROBLEM — M99.04 SOMATIC DYSFUNCTION OF SACRAL REGION: Status: RESOLVED | Noted: 2019-11-25 | Resolved: 2022-12-14

## 2022-12-14 PROBLEM — M54.50 CHRONIC BILATERAL LOW BACK PAIN WITHOUT SCIATICA: Status: RESOLVED | Noted: 2019-11-25 | Resolved: 2022-12-14

## 2022-12-14 PROBLEM — M99.01 CERVICAL SEGMENT DYSFUNCTION: Status: RESOLVED | Noted: 2019-11-25 | Resolved: 2022-12-14

## 2022-12-14 PROBLEM — E78.00 PURE HYPERCHOLESTEROLEMIA: Status: ACTIVE | Noted: 2022-12-14

## 2022-12-15 ENCOUNTER — OFFICE VISIT (OUTPATIENT)
Dept: INTERNAL MEDICINE | Facility: CLINIC | Age: 62
End: 2022-12-15
Payer: COMMERCIAL

## 2022-12-15 VITALS
HEIGHT: 60 IN | SYSTOLIC BLOOD PRESSURE: 124 MMHG | TEMPERATURE: 98.8 F | DIASTOLIC BLOOD PRESSURE: 82 MMHG | BODY MASS INDEX: 29.72 KG/M2 | HEART RATE: 101 BPM | WEIGHT: 151.4 LBS | OXYGEN SATURATION: 98 %

## 2022-12-15 DIAGNOSIS — E78.00 PURE HYPERCHOLESTEROLEMIA: ICD-10-CM

## 2022-12-15 DIAGNOSIS — Z00.00 ROUTINE HISTORY AND PHYSICAL EXAMINATION OF ADULT: Primary | ICD-10-CM

## 2022-12-15 DIAGNOSIS — Z12.31 ENCOUNTER FOR SCREENING MAMMOGRAM FOR BREAST CANCER: ICD-10-CM

## 2022-12-15 PROBLEM — R73.01 IMPAIRED FASTING GLUCOSE: Status: ACTIVE | Noted: 2022-12-15

## 2022-12-15 PROCEDURE — 99396 PREV VISIT EST AGE 40-64: CPT | Performed by: INTERNAL MEDICINE

## 2022-12-15 PROCEDURE — 99214 OFFICE O/P EST MOD 30 MIN: CPT | Mod: 25 | Performed by: INTERNAL MEDICINE

## 2022-12-15 RX ORDER — ROSUVASTATIN CALCIUM 20 MG/1
20 TABLET, COATED ORAL DAILY
Qty: 90 TABLET | Refills: 3 | Status: SHIPPED | OUTPATIENT
Start: 2022-12-15 | End: 2023-05-08

## 2022-12-15 ASSESSMENT — ENCOUNTER SYMPTOMS
CHILLS: 0
BREAST MASS: 0
JOINT SWELLING: 0
COUGH: 0
EYE PAIN: 0
NAUSEA: 0
NERVOUS/ANXIOUS: 0
DIARRHEA: 0
ABDOMINAL PAIN: 0
DYSURIA: 0
SORE THROAT: 0
PALPITATIONS: 0
FREQUENCY: 0
SHORTNESS OF BREATH: 0
MYALGIAS: 0
CONSTIPATION: 0
DIZZINESS: 0
ARTHRALGIAS: 0
HEADACHES: 0
HEMATOCHEZIA: 0
HEARTBURN: 1
HEMATURIA: 0
PARESTHESIAS: 0
WEAKNESS: 0
FEVER: 0

## 2022-12-15 ASSESSMENT — PAIN SCALES - GENERAL: PAINLEVEL: NO PAIN (0)

## 2022-12-15 NOTE — PROGRESS NOTES
ASSESSMENT/PLAN                                                       (Z00.00) Routine history and physical examination of adult  (primary encounter diagnosis)  Comment: PMH, PSH, FH, SH, medications, allergies, immunizations, and preventative health measures reviewed and updated as appropriate.  Plan: see below for plans.      (E78.00) Pure hypercholesterolemia  Comment: .  Plan: START Crestor 20mg daily; fasting lipid profile in 3 months.     (Z12.31) Encounter for screening mammogram for breast cancer  Plan: screening mammogram ordered - patient to schedule.     Lola Mcconnell MD   [address] [address]  T: [phone], F: [phone]    DREW Parker is a very pleasant 62 year old female who presents for a physical.    ROS:  Constitutional: no unintentional weight loss or gain reported; no fevers, chills, or sweats reported  Cardiovascular: no chest pain, palpitations, or edema reported  Respiratory: no cough, wheezing, shortness of breath, or dyspnea on exertion reported  Gastrointestinal: no nausea, vomiting, constipation, diarrhea, or abdominal pain reported  Genitourinary: no urinary frequency, urgency, dysuria, or hematuria reported  Integumentary: no rash or pruritus reported  Musculoskeletal: no back pain, muscle pain, joint pain, or joint swelling reported  Neurologic: no focal weakness, numbness, or tingling reported  Hematologic: no easy bruising or bleeding reported  Endocrine: no heat or cold intolerance reported; no polyuria or polydipsia reported  Psychiatric: no anxiety or depression reported    Past Medical History:   Diagnosis Date     Impaired fasting glucose      Pure hypercholesterolemia      Past Surgical History:   Procedure Laterality Date     APPENDECTOMY       DISCECTOMY LUMBAR POSTERIOR MICROSCOPIC ONE LEVEL      L5-S1     ENDOSCOPIC RELEASE ULNAR NERVE (ELBOW)  Bilateral      ESOPHAGOSCOPY, GASTROSCOPY, DUODENOSCOPY (EGD), COMBINED N/A 01/24/2019    Procedure: COMBINED ESOPHAGOSCOPY, GASTROSCOPY, DUODENOSCOPY (EGD), BIOPSY SINGLE OR MULTIPLE;  Surgeon: Manolo Solares MD;  Location:  GI     TONSILLECTOMY & ADENOIDECTOMY       TUBAL LIGATION       Family History   Problem Relation Age of Onset     Myocardial Infarction Maternal Grandmother      Diabetes Type 2  No family hx of      Cerebrovascular Disease No family hx of      Coronary Artery Disease Early Onset No family hx of      Breast Cancer No family hx of      Colon Cancer No family hx of      Ovarian Cancer No family hx of      Social History     Occupational History     Occupation: Patient Registration   Tobacco Use     Smoking status: Never     Smokeless tobacco: Never   Vaping Use     Vaping Use: Never used   Substance and Sexual Activity     Alcohol use: Yes     Comment: ~1-2 drinks/daily     Drug use: No     Sexual activity: Not Currently   Social History Narrative    .    Two adult kids.    7 grandchildren.    No formal exercise.      Allergies   Allergen Reactions     Aspirin Rash     Penicillins Unknown     Piroxicam Rash     Sulfa Drugs Rash     Current Outpatient Medications   Medication Sig     rosuvastatin (CRESTOR) 20 MG tablet Take 1 tablet (20 mg) by mouth daily     Immunization History   Administered Date(s) Administered     COVID-19 Vaccine 12+ (Pfizer) 03/12/2021, 03/30/2021, 11/19/2021     COVID-19 Vaccine Bivalent Booster 12+ (Pfizer) 09/22/2022     Influenza (H1N1) 11/21/2009     Influenza (IIV3) PF 10/17/1994, 10/01/1997, 10/25/2012     Influenza Vaccine 50-64 or 18-64 w/egg allergy (Flublok) 09/30/2021, 11/17/2022     MMR 06/06/2012     TDAP Vaccine (Adacel) 04/06/2007     Tdap (Adacel,Boostrix) 10/04/2021     Zoster vaccine recombinant adjuvanted (SHINGRIX) 05/10/2022, 07/21/2022     Zoster vaccine, live 10/28/2014     PREVENTATIVE HEALTH                                                       BMI: overweight  Blood pressure: within normal limits   Breast CA screening: DUE  Cervical CA screening: up to date   Colon CA screening: up to date   Lung CA screening: n/a   Dexa: not medically indicated at this time   Screening cholesterol: up to date   Screening diabetes: up to date   STD testing: no risk factors present  Alcohol misuse screening: alcohol use reviewed - no intervention indicated at this time  Immunizations: reviewed; up to date     OBJECTIVE                                                      /82   Pulse 101   Temp 98.8  F (37.1  C) (Tympanic)   Ht 1.524 m (5')   Wt 68.7 kg (151 lb 6.4 oz)   LMP  (LMP Unknown)   SpO2 98%   Breastfeeding No   BMI 29.57 kg/m    Constitutional: well-appearing  Head, Ears, and Eyes: normocephalic; normal external auditory canal and pinna; tympanic membranes visualized and normal; normal lids and conjunctivae  Neck: supple, symmetric, no thyromegaly or lymphadenopathy  Respiratory: normal respiratory effort; clear to auscultation bilaterally  Cardiovascular: regular rate and rhythm; no edema  Gastrointestinal: soft, non-tender, and non-distended; no organomegaly or masses  Musculoskeletal: normal gait and station  Psych: normal judgment and insight; normal mood and affect; recent and remote memory intact    ---  (Note was completed, in part, with [employer] voice-recognition software. Documentation was reviewed, but some grammatical, spelling, and word errors may remain.)

## 2023-01-19 ENCOUNTER — ANCILLARY ORDERS (OUTPATIENT)
Dept: INTERNAL MEDICINE | Facility: CLINIC | Age: 63
End: 2023-01-19

## 2023-01-19 DIAGNOSIS — Z12.31 VISIT FOR SCREENING MAMMOGRAM: ICD-10-CM

## 2023-01-19 DIAGNOSIS — Z12.31 ENCOUNTER FOR SCREENING MAMMOGRAM FOR BREAST CANCER: ICD-10-CM

## 2023-02-01 ENCOUNTER — TELEPHONE (OUTPATIENT)
Dept: URGENT CARE | Facility: URGENT CARE | Age: 63
End: 2023-02-01

## 2023-02-01 DIAGNOSIS — H60.391 INFECTIVE OTITIS EXTERNA, RIGHT: Primary | ICD-10-CM

## 2023-02-01 RX ORDER — CEFDINIR 300 MG/1
300 CAPSULE ORAL 2 TIMES DAILY
Qty: 14 CAPSULE | Refills: 0 | Status: SHIPPED | OUTPATIENT
Start: 2023-02-01 | End: 2023-02-08

## 2023-02-01 NOTE — TELEPHONE ENCOUNTER
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      Right ear pain for the past couple of days, noticed scant bleeding today.      No fevers or trauma or URI symptoms.  Feels like it is a pimple.      Erythematous area of skin at outer portion of external ear on back side of tragus.      Follow up for further evaluation should symptoms persist or worsen.      (H60.391) Infective otitis externa, right  (primary encounter diagnosis)  Comment:   Plan: cefdinir (OMNICEF) 300 MG capsule

## 2023-03-07 ENCOUNTER — OFFICE VISIT (OUTPATIENT)
Dept: FAMILY MEDICINE | Facility: CLINIC | Age: 63
End: 2023-03-07
Payer: COMMERCIAL

## 2023-03-07 VITALS
SYSTOLIC BLOOD PRESSURE: 116 MMHG | WEIGHT: 154.6 LBS | TEMPERATURE: 97.1 F | HEART RATE: 98 BPM | OXYGEN SATURATION: 96 % | RESPIRATION RATE: 16 BRPM | DIASTOLIC BLOOD PRESSURE: 70 MMHG | BODY MASS INDEX: 30.35 KG/M2 | HEIGHT: 60 IN

## 2023-03-07 DIAGNOSIS — M17.11 PRIMARY OSTEOARTHRITIS OF RIGHT KNEE: Primary | ICD-10-CM

## 2023-03-07 PROCEDURE — 99213 OFFICE O/P EST LOW 20 MIN: CPT | Mod: 25 | Performed by: PHYSICIAN ASSISTANT

## 2023-03-07 PROCEDURE — 20610 DRAIN/INJ JOINT/BURSA W/O US: CPT | Performed by: PHYSICIAN ASSISTANT

## 2023-03-07 RX ORDER — METHYLPREDNISOLONE ACETATE 80 MG/ML
80 INJECTION, SUSPENSION INTRA-ARTICULAR; INTRALESIONAL; INTRAMUSCULAR; SOFT TISSUE ONCE
Status: COMPLETED | OUTPATIENT
Start: 2023-03-07 | End: 2023-03-07

## 2023-03-07 RX ADMIN — METHYLPREDNISOLONE ACETATE 80 MG: 80 INJECTION, SUSPENSION INTRA-ARTICULAR; INTRALESIONAL; INTRAMUSCULAR; SOFT TISSUE at 10:55

## 2023-03-07 ASSESSMENT — PAIN SCALES - GENERAL: PAINLEVEL: MODERATE PAIN (5)

## 2023-03-07 NOTE — PROGRESS NOTES
Assessment & Plan   Problem List Items Addressed This Visit    None  Visit Diagnoses     Primary osteoarthritis of right knee    -  Primary    Relevant Medications    methylPREDNISolone (DEPO-MEDROL) injection 80 mg (Completed)    Other Relevant Orders    DRAIN/INJECT LARGE JOINT/BURSA (Completed)           PROCEDURE NOTE:  Right  knee was cleaned with betadine then wiped away with alcohol.    40 mg Kenalog and 5 cc of 1 percent lidocaine was injected into the right knee using a lateral approach.   Appropriate wound care dressing applied.    Patient tolerated the procedure well.                   BMI:   Estimated body mass index is 30.19 kg/m  as calculated from the following:    Height as of this encounter: 1.524 m (5').    Weight as of this encounter: 70.1 kg (154 lb 9.6 oz).           No follow-ups on file.    Steph Montano PA-C  [facility]   Pretty is a 62 year old, presenting for the following health issues:  Musculoskeletal Problem (Knee injection )      HPI     Pt Here for Knee Injection- Right Knee     How many servings of fruits and vegetables do you eat daily?  2-3    On average, how many sweetened beverages do you drink each day (Examples: soda, juice, sweet tea, etc.  Do NOT count diet or artificially sweetened beverages)?   0    How many days per week do you exercise enough to make your heart beat faster? 3 or less    How many minutes a day do you exercise enough to make your heart beat faster? 30 - 60    How many days per week do you miss taking your medication? 0    History of known osteoarthritis of the right knee.  XR in the past, has not been updated in several years.    She has been seeing Dr. Cortes at [facility]  Getting cortisone injections every 6-8 months, when symptoms flair.   Ok to repeat today.         Review of Systems         Objective    LMP  (LMP Unknown)   There is no height or weight on file to calculate BMI.  Physical  Exam  Constitutional:       General: She is not in acute distress.     Appearance: She is well-developed. She is not diaphoretic.   HENT:      Head: Normocephalic.      Right Ear: External ear normal.      Left Ear: External ear normal.      Nose: Nose normal.   Eyes:      Conjunctiva/sclera: Conjunctivae normal.   Pulmonary:      Effort: Pulmonary effort is normal.   Musculoskeletal:      Cervical back: Normal range of motion.      Right knee: Effusion (small) present. No erythema or ecchymosis. Normal range of motion. Normal alignment.   Neurological:      Mental Status: She is alert and oriented to person, place, and time.   Psychiatric:         Judgment: Judgment normal.

## 2023-03-08 ENCOUNTER — LAB (OUTPATIENT)
Dept: LAB | Facility: CLINIC | Age: 63
End: 2023-03-08
Payer: COMMERCIAL

## 2023-03-08 DIAGNOSIS — E78.00 PURE HYPERCHOLESTEROLEMIA: ICD-10-CM

## 2023-03-08 LAB
CHOLEST SERPL-MCNC: 242 MG/DL
HDLC SERPL-MCNC: 90 MG/DL
LDLC SERPL CALC-MCNC: 137 MG/DL
NONHDLC SERPL-MCNC: 152 MG/DL
TRIGL SERPL-MCNC: 76 MG/DL

## 2023-03-08 PROCEDURE — 36415 COLL VENOUS BLD VENIPUNCTURE: CPT

## 2023-03-08 PROCEDURE — 80061 LIPID PANEL: CPT

## 2023-03-23 ENCOUNTER — ANCILLARY PROCEDURE (OUTPATIENT)
Dept: MAMMOGRAPHY | Facility: CLINIC | Age: 63
End: 2023-03-23
Attending: INTERNAL MEDICINE
Payer: COMMERCIAL

## 2023-03-23 ENCOUNTER — ANCILLARY ORDERS (OUTPATIENT)
Dept: INTERNAL MEDICINE | Facility: CLINIC | Age: 63
End: 2023-03-23

## 2023-03-23 DIAGNOSIS — Z12.31 VISIT FOR SCREENING MAMMOGRAM: ICD-10-CM

## 2023-03-23 DIAGNOSIS — Z12.31 ENCOUNTER FOR SCREENING MAMMOGRAM FOR BREAST CANCER: ICD-10-CM

## 2023-03-23 PROCEDURE — 77063 BREAST TOMOSYNTHESIS BI: CPT | Mod: TC | Performed by: RADIOLOGY

## 2023-03-23 PROCEDURE — 77067 SCR MAMMO BI INCL CAD: CPT | Mod: TC | Performed by: RADIOLOGY

## 2023-05-08 ENCOUNTER — OFFICE VISIT (OUTPATIENT)
Dept: FAMILY MEDICINE | Facility: CLINIC | Age: 63
End: 2023-05-08
Payer: COMMERCIAL

## 2023-05-08 VITALS
TEMPERATURE: 97.3 F | HEIGHT: 60 IN | DIASTOLIC BLOOD PRESSURE: 74 MMHG | RESPIRATION RATE: 16 BRPM | OXYGEN SATURATION: 96 % | WEIGHT: 154.2 LBS | SYSTOLIC BLOOD PRESSURE: 126 MMHG | HEART RATE: 81 BPM | BODY MASS INDEX: 30.27 KG/M2

## 2023-05-08 DIAGNOSIS — F43.22 ADJUSTMENT DISORDER WITH ANXIOUS MOOD: Primary | ICD-10-CM

## 2023-05-08 DIAGNOSIS — E78.00 PURE HYPERCHOLESTEROLEMIA: ICD-10-CM

## 2023-05-08 PROCEDURE — 99214 OFFICE O/P EST MOD 30 MIN: CPT | Performed by: PHYSICIAN ASSISTANT

## 2023-05-08 RX ORDER — ROSUVASTATIN CALCIUM 20 MG/1
20 TABLET, COATED ORAL DAILY
Qty: 90 TABLET | Refills: 3 | Status: SHIPPED | OUTPATIENT
Start: 2023-05-08

## 2023-05-08 ASSESSMENT — PAIN SCALES - GENERAL: PAINLEVEL: NO PAIN (0)

## 2023-05-08 NOTE — PROGRESS NOTES
Assessment & Plan   Problem List Items Addressed This Visit        Endocrine    Pure hypercholesterolemia    Relevant Medications    rosuvastatin (CRESTOR) 20 MG tablet   Other Visit Diagnoses     Adjustment disorder with anxious mood    -  Primary         For FMLA paperwork - seeking intermittent leave for 1-2 times a month for 2-3 days as needed.   LA dates - 5/8/23 - 11/8/23                   Steph Montano PA-C  [facility]    For FMLA paperwork - seeking intermittent leave for 1-2 times a month for 2-3 days as needed.       Jeremi Olsen is a 62 year old, presenting for the following health issues:  Recheck Medication        5/8/2023     1:19 PM   Additional Questions   Roomed by Libia     History of Present Illness       Reason for visit:  Med  ck    She eats 4 or more servings of fruits and vegetables daily.She consumes 1 sweetened beverage(s) daily.She exercises with enough effort to increase her heart rate 30 to 60 minutes per day.  She exercises with enough effort to increase her heart rate 5 days per week. She is missing 1 dose(s) of medications per week.       Headache  Chest pain  HR 120s    New job is very stressful  45 min lunch break when she is able to relax.   Exercise every day after work, which helps.             Review of Systems         Objective    LMP  (LMP Unknown)   There is no height or weight on file to calculate BMI.  Physical Exam  Constitutional:       General: She is not in acute distress.     Appearance: She is well-developed. She is not diaphoretic.   HENT:      Head: Normocephalic.      Right Ear: External ear normal.      Left Ear: External ear normal.      Nose: Nose normal.   Eyes:      Conjunctiva/sclera: Conjunctivae normal.   Pulmonary:      Effort: Pulmonary effort is normal.   Musculoskeletal:      Cervical back: Normal range of motion.   Neurological:      Mental Status: She is alert and oriented to person, place, and time.    Psychiatric:         Judgment: Judgment normal.

## 2023-05-11 ENCOUNTER — TELEPHONE (OUTPATIENT)
Dept: FAMILY MEDICINE | Facility: CLINIC | Age: 63
End: 2023-05-11
Payer: COMMERCIAL

## 2023-05-11 NOTE — TELEPHONE ENCOUNTER
Reason for Call:  Form, our goal is to have forms completed with 72 hours, however, some forms may require a visit or additional information.    Type of letter, form or note:  FMLA    Who is the form from?: Patient    Where did the form come from: From [employer].    What clinic location was the form placed at?: [facility]    Where the form was placed: in Steph Montano box Box/Folder    What number is listed as a contact on the form?: [phone]       Additional comments: no    Call taken on 5/11/2023 at 12:21 PM by Madelyn Tomas

## 2023-05-15 NOTE — TELEPHONE ENCOUNTER
Picked up completed forms, faxed to [phone], and sent to [employer].    Niki YATES    [facility]

## 2023-07-05 ENCOUNTER — OFFICE VISIT (OUTPATIENT)
Dept: FAMILY MEDICINE | Facility: CLINIC | Age: 63
End: 2023-07-05
Payer: COMMERCIAL

## 2023-07-05 DIAGNOSIS — L30.9 DERMATITIS: Primary | ICD-10-CM

## 2023-07-05 DIAGNOSIS — D23.9 DERMATOFIBROMA: ICD-10-CM

## 2023-07-05 PROCEDURE — 99204 OFFICE O/P NEW MOD 45 MIN: CPT | Performed by: NURSE PRACTITIONER

## 2023-07-05 RX ORDER — TRIAMCINOLONE ACETONIDE 1 MG/G
OINTMENT TOPICAL 2 TIMES DAILY
Qty: 80 G | Refills: 0 | Status: SHIPPED | OUTPATIENT
Start: 2023-07-05 | End: 2023-12-22

## 2023-07-05 NOTE — LETTER
7/5/2023         RE: Pretty Parker  [address] [place] [address]        Dear Colleague,    Thank you for referring your patient, Pretty Parker, to the [facility]. Please see a copy of my visit note below.    [facility] Dermatology Note  Encounter Date: Jul 5, 2023  Office Visit     Reviewed patients past medical history and pertinent chart review prior to patients visit today.     Dermatology Problem List:  Epidermoid cyst excision 8/19/19  dermatofibroma excised left lower posterior shoulder 5/9/19  Dermatitis of chest, apply triamcinolone 0.1% ointment twice daily until resolved    Patient denies personal history of skin cancer or dysplastic nevi.    Patient denies family history of skin cancer or dysplastic nevi.      ____________________________________________    Assessment & Plan:     # Dermatofibroma, left medial thigh. benign, no further treatment needed.       # dermatitis.   -apply triamcinolone 0.1% ointment twice daily until itching and redness resolves.  Likely 1 to 2 weeks. Councled about use and side effects of thinning of the skin, striae, erythema, and worsening of rash.     -Moisturize daily with moisturizing cream or ointment    Follow-up in 1 month if not fully resolved  Josy Curiel CNP  Dermatology    _______________________________________    CC: Derm Problem (Recent vacation was sunburned- now areas are bruising)    HPI:  Ms. Pretty Parker is a(n) 62 year old female who presents today as a new patient for itching on her chest.  She went to [place] about a month ago and got a bad sunburn on her chest and ever since then she has been itchy and dry on her chest and front of her neck.  She has been using some Goldbond with menthol which helps the itch some but it does not seem to be going away.  She also has a spot of concern on her left thigh.  This has been there for several years.  Sometimes it is itchy but otherwise has not been  changing.    Patient is otherwise feeling well, without additional skin concerns.      Physical Exam:  SKIN: Focused examination of chest, neck, superior breast, left medial thigh lesion was performed.  -Left medial thigh, about a 4 mm pink firm papule with positive dimple sign and stellate center on dermoscopy  -Erythema lichenification and fine scaling of the left and right lateral breast/chest and anterior neck    - No other lesions of concern on areas examined.     Medications:  Current Outpatient Medications   Medication     triamcinolone (KENALOG) 0.1 % external ointment     Cyanocobalamin (B-12) 1000 MCG TBCR     rosuvastatin (CRESTOR) 20 MG tablet     No current facility-administered medications for this visit.      Past Medical History:   Patient Active Problem List   Diagnosis     Acid reflux disease     Pure hypercholesterolemia     Impaired fasting glucose     Past Medical History:   Diagnosis Date     Impaired fasting glucose      Pure hypercholesterolemia        CC No referring provider defined for this encounter. on close of this encounter.       Again, thank you for allowing me to participate in the care of your patient.        Sincerely,        KATEY Medina CNP

## 2023-07-05 NOTE — PROGRESS NOTES
[facility] Dermatology Note  Encounter Date: Jul 5, 2023  Office Visit     Reviewed patients past medical history and pertinent chart review prior to patients visit today.     Dermatology Problem List:  Epidermoid cyst excision 8/19/19  dermatofibroma excised left lower posterior shoulder 5/9/19  Dermatitis of chest, apply triamcinolone 0.1% ointment twice daily until resolved    Patient denies personal history of skin cancer or dysplastic nevi.    Patient denies family history of skin cancer or dysplastic nevi.      ____________________________________________    Assessment & Plan:     # Dermatofibroma, left medial thigh. benign, no further treatment needed.       # dermatitis.   -apply triamcinolone 0.1% ointment twice daily until itching and redness resolves.  Likely 1 to 2 weeks. Councled about use and side effects of thinning of the skin, striae, erythema, and worsening of rash.     -Moisturize daily with moisturizing cream or ointment    Follow-up in 1 month if not fully resolved  Josy Curiel, JUSTIN  Dermatology    _______________________________________    CC: Derm Problem (Recent vacation was sunburned- now areas are bruising)    HPI:  Ms. Pretty Parker is a(n) 62 year old female who presents today as a new patient for itching on her chest.  She went to [place] about a month ago and got a bad sunburn on her chest and ever since then she has been itchy and dry on her chest and front of her neck.  She has been using some Goldbond with menthol which helps the itch some but it does not seem to be going away.  She also has a spot of concern on her left thigh.  This has been there for several years.  Sometimes it is itchy but otherwise has not been changing.    Patient is otherwise feeling well, without additional skin concerns.      Physical Exam:  SKIN: Focused examination of chest, neck, superior breast, left medial thigh lesion was performed.  -Left medial thigh, about a 4 mm pink firm papule  with positive dimple sign and stellate center on dermoscopy  -Erythema lichenification and fine scaling of the left and right lateral breast/chest and anterior neck    - No other lesions of concern on areas examined.     Medications:  Current Outpatient Medications   Medication     triamcinolone (KENALOG) 0.1 % external ointment     Cyanocobalamin (B-12) 1000 MCG TBCR     rosuvastatin (CRESTOR) 20 MG tablet     No current facility-administered medications for this visit.      Past Medical History:   Patient Active Problem List   Diagnosis     Acid reflux disease     Pure hypercholesterolemia     Impaired fasting glucose     Past Medical History:   Diagnosis Date     Impaired fasting glucose      Pure hypercholesterolemia        CC No referring provider defined for this encounter. on close of this encounter.

## 2023-07-24 RX ORDER — TOBRAMYCIN AND DEXAMETHASONE 3; 1 MG/ML; MG/ML
1-2 SUSPENSION/ DROPS OPHTHALMIC EVERY 6 HOURS
Qty: 2.8 ML | Refills: 0 | Status: SHIPPED | OUTPATIENT
Start: 2023-07-24 | End: 2023-07-31

## 2023-11-01 ENCOUNTER — IMMUNIZATION (OUTPATIENT)
Dept: NURSING | Facility: CLINIC | Age: 63
End: 2023-11-01
Payer: COMMERCIAL

## 2023-11-01 PROCEDURE — 90686 IIV4 VACC NO PRSV 0.5 ML IM: CPT

## 2023-11-01 PROCEDURE — 90471 IMMUNIZATION ADMIN: CPT

## 2023-11-15 ENCOUNTER — PATIENT OUTREACH (OUTPATIENT)
Dept: CARE COORDINATION | Facility: CLINIC | Age: 63
End: 2023-11-15
Payer: COMMERCIAL

## 2023-11-29 ENCOUNTER — PATIENT OUTREACH (OUTPATIENT)
Dept: CARE COORDINATION | Facility: CLINIC | Age: 63
End: 2023-11-29
Payer: COMMERCIAL

## 2023-12-01 ENCOUNTER — IMMUNIZATION (OUTPATIENT)
Dept: NURSING | Facility: CLINIC | Age: 63
End: 2023-12-01
Payer: COMMERCIAL

## 2023-12-01 PROCEDURE — 90480 ADMN SARSCOV2 VAC 1/ONLY CMP: CPT

## 2023-12-01 PROCEDURE — 91320 SARSCV2 VAC 30MCG TRS-SUC IM: CPT

## 2023-12-22 ENCOUNTER — OFFICE VISIT (OUTPATIENT)
Dept: FAMILY MEDICINE | Facility: CLINIC | Age: 63
End: 2023-12-22

## 2023-12-22 VITALS
HEIGHT: 61 IN | WEIGHT: 154 LBS | HEART RATE: 87 BPM | TEMPERATURE: 97.8 F | OXYGEN SATURATION: 96 % | SYSTOLIC BLOOD PRESSURE: 128 MMHG | DIASTOLIC BLOOD PRESSURE: 78 MMHG | BODY MASS INDEX: 29.07 KG/M2

## 2023-12-22 DIAGNOSIS — E78.00 PURE HYPERCHOLESTEROLEMIA: ICD-10-CM

## 2023-12-22 DIAGNOSIS — R06.00 DYSPNEA, UNSPECIFIED TYPE: ICD-10-CM

## 2023-12-22 DIAGNOSIS — R00.2 PALPITATIONS: Primary | ICD-10-CM

## 2023-12-22 DIAGNOSIS — K76.0 FATTY LIVER DISEASE, NONALCOHOLIC: ICD-10-CM

## 2023-12-22 DIAGNOSIS — R53.83 FATIGUE, UNSPECIFIED TYPE: ICD-10-CM

## 2023-12-22 PROBLEM — Z76.89 HEALTH CARE HOME: Status: ACTIVE | Noted: 2023-12-22

## 2023-12-22 PROBLEM — Z71.89 ACP (ADVANCE CARE PLANNING): Status: ACTIVE | Noted: 2023-12-22

## 2023-12-22 LAB
ALBUMIN SERPL-MCNC: 4.4 G/DL (ref 3.6–5.1)
ALBUMIN/GLOB SERPL: 1.6 {RATIO} (ref 1–2.5)
ALP SERPL-CCNC: 76 U/L (ref 33–130)
ALT 1742-6: 26 U/L (ref 0–32)
AST 1920-8: 17 U/L (ref 0–35)
BILIRUB SERPL-MCNC: 0.6 MG/DL (ref 0.2–1.2)
BUN SERPL-MCNC: 15 MG/DL (ref 7–25)
BUN/CREATININE RATIO: 20.8 (ref 6–32)
CALCIUM SERPL-MCNC: 9.7 MG/DL (ref 8.6–10.3)
CHLORIDE SERPLBLD-SCNC: 104.7 MMOL/L (ref 98–110)
CO2 SERPL-SCNC: 30 MMOL/L (ref 20–32)
CREAT SERPL-MCNC: 0.72 MG/DL (ref 0.6–1.3)
ERYTHROCYTE [DISTWIDTH] IN BLOOD BY AUTOMATED COUNT: 13.5 %
GLOBULIN, CALCULATED - QUEST: 2.7 (ref 1.9–3.7)
GLUCOSE SERPL-MCNC: 94 MG/DL (ref 60–99)
HCT VFR BLD AUTO: 43.6 % (ref 35–47)
HEMOGLOBIN: 14.2 G/DL (ref 11.7–15.7)
MCH RBC QN AUTO: 30 PG (ref 26–33)
MCHC RBC AUTO-ENTMCNC: 32.6 G/DL (ref 31–36)
MCV RBC AUTO: 91.9 FL (ref 78–100)
PLATELET COUNT - QUEST: 195 10^9/L (ref 150–375)
POTASSIUM SERPL-SCNC: 4.23 MMOL/L (ref 3.5–5.3)
PROT SERPL-MCNC: 7.1 G/DL (ref 6.1–8.1)
RBC # BLD AUTO: 4.74 10*12/L (ref 3.8–5.2)
SODIUM SERPL-SCNC: 141.6 MMOL/L (ref 135–146)
WBC # BLD AUTO: 7 10*9/L (ref 4–11)

## 2023-12-22 PROCEDURE — 80053 COMPREHEN METABOLIC PANEL: CPT

## 2023-12-22 PROCEDURE — 85027 COMPLETE CBC AUTOMATED: CPT

## 2023-12-22 PROCEDURE — 84439 ASSAY OF FREE THYROXINE: CPT | Mod: 90

## 2023-12-22 PROCEDURE — 84443 ASSAY THYROID STIM HORMONE: CPT | Mod: 90

## 2023-12-22 PROCEDURE — 99203 OFFICE O/P NEW LOW 30 MIN: CPT

## 2023-12-22 PROCEDURE — 36415 COLL VENOUS BLD VENIPUNCTURE: CPT

## 2023-12-22 RX ORDER — MULTIPLE VITAMINS W/ MINERALS TAB 9MG-400MCG
1 TAB ORAL DAILY
COMMUNITY

## 2023-12-22 NOTE — PATIENT INSTRUCTIONS
Thanks for coming in today Pretty.     I will send you a my chart message regarding your lab results.     Someone will reach out to you regarding the zio patch and echocardiogram.     Please let me know if any questions or concerns.  
yes

## 2023-12-22 NOTE — LETTER
[facility]  [address] [place]  [address]  [facility] [address]  [phone]      December 22, 2023      Pretty Parker  [address] [address]      EMERGENCY CARE PLAN  Presenting Problem Treatment Plan   Questions or concerns during clinic hours I will call the clinic directly:    [facility]  [address] , [address]  [place] [address] [phone]   Questions or concerns outside clinic hours  I will call the 24 hour line at [phone]   Patient needs to schedule an appointment  I will call the  scheduling line at [phone]   Same day treatment   I will call the clinic first, then  urgent care and/or  express care if needed   Clinic Care Coordinators Monae Segura RN:  [phone]  [facility] Clinical Support Staff: [phone]    Crisis Services:  Behavioral or Mental Health P (Behavioral Health Providers)   [phone]   Emergency treatment--Immediately CALL [phone]

## 2023-12-22 NOTE — PROGRESS NOTES
Assessment & Plan     1. Palpitations  - Discussed with Pretty work up for palpitations, fatigue, and dyspnea which will include CBC, CMP, thyroid, zio path, and stress echo. Will update patient with lab results via my chart. Also did discuss in meantime for patient to try cutting back on daily alcohol intake to see if this will help with any of her symptoms. Return to clinic and ER precautions discussed.   - VENOUS COLLECTION  - Comprehensive Metobolic Panel (B[facility])  - TSH ([facility])  - T4 FREE ([facility])  - Adult Leadless EKG Monitor 8 to 14 Days; Future  - Echocardiogram Exercise Stress; Future    2. Fatigue, unspecified type  - See above.   - VENOUS COLLECTION  - Hemogram Platelet (BFP)  - Comprehensive Metobolic Panel (BFP)  - Adult Leadless EKG Monitor 8 to 14 Days; Future  - Echocardiogram Exercise Stress; Future    3. Dyspnea, unspecified type  - See above.   - VENOUS COLLECTION  - Hemogram Platelet (BFP)  - Comprehensive Metobolic Panel (BFP)  - Adult Leadless EKG Monitor 8 to 14 Days; Future  - Echocardiogram Exercise Stress; Future    4. Pure hypercholesterolemia  - Continue rosuvastatin 20 mg daily, will plan to reassess lipid control in near future.     Will follow up pending lab work, zio patch, and stress echo results. Reasons to follow-up sooner or seek emergent care reviewed.     Portia Fleming PA-C  [facility]       Subjective     Pretty Parker is a 63 year old female who presents to clinic today for the following health issues:    HPI   Chief Complaint   Patient presents with     New Patient     New patient to our clinic     Palpitations     Discuss irregular pulse, over the last few weeks when her pulse is elevated she feels a head pressure, flutter in her chest, and becomes warm, she has noticed she has more fatigue and easy exertion, some chest discomfort, pulse has gone up to 154      Pretty is a new patient to the clinic who presents with concerns of palpitations, head pressure,  "and chest pain for the last two weeks. She notes these symptoms are not daily but occur every couple of days. She states her symptoms occur when she is active and at rest. She notes when her symptoms start, her apple watch will notify her that she has a very high pulse (typically around 140-155) and she experiences a flutter sensation in her chest. She also feels warm, has a head pressure, and has chest pressure. She has never had these symptoms before. She notes these episodes are very random and she typically last for a couple of minutes and improve when she sits or lies down, however soon after she feels very tired. She in general notes she has more fatigue and does not have the energy to be as active as she typically is. She is usually very active and enjoys going on walks, hikes, etc. She denies any recent illnesses or any other triggers (no new medications). She also denies any coughing or swelling in her legs.     Past medical history is significant for hyperlipidemia and fatty liver disease. She does not have a history of any cardiac or pulmonary problems, hypertension, diabetes, etc. She takes Rosuvastatin 20 mg daily. No history of smoking, but does drink 1/2 glass of wine along with a shot or two of darrel each night.     Of note, she also has a history of dyspnea after COVID a year ago and was recommended to get a stress echo but did not have this completed.       Objective    /78 (BP Location: Left arm, Patient Position: Sitting, Cuff Size: Adult Regular)   Pulse 87   Temp 97.8  F (36.6  C) (Temporal)   Ht 1.543 m (5' 0.75\")   Wt 69.9 kg (154 lb)   LMP  (LMP Unknown)   SpO2 96%   BMI 29.34 kg/m    Body mass index is 29.34 kg/m .    Physical Examination:  GENERAL: healthy, alert and no distress  EYES: Eyes grossly normal to inspection, PERRL and conjunctivae and sclerae normal  HENT: ear canals and TM's normal, nose and mouth without ulcers or lesions  NECK: no adenopathy, no asymmetry, " masses, or scars and thyroid normal to palpation  RESP: lungs clear to auscultation - no rales, rhonchi or wheezes  CV: regular rate and rhythm, normal S1 S2, no S3 or S4, no murmur, click or rub, no peripheral edema   ABDOMEN: soft, nontender, no masses and bowel sounds normal  MS: no gross musculoskeletal defects noted, no edema  SKIN: no suspicious lesions or rashes  NEURO: Normal strength and tone, mentation intact and speech normal  PSYCH: mentation appears normal, affect normal/bright    Labs reviewed.  Results for orders placed or performed in visit on 12/22/23   Hemogram Platelet (BFP)     Status: None   Result Value Ref Range    WBC 7.0 4.0 - 11 10*9/L    RBC Count 4.74 3.8 - 5.2 10*12/L    Hemoglobin 14.2 11.7 - 15.7 g/dL    Hematocrit 43.6 35.0 - 47.0 %    MCV 91.9 78 - 100 fL    MCH 30.0 26 - 33 pg    MCHC 32.6 31 - 36 g/dL    RDW 13.5 %    Platelet Count 195 150 - 375 10^9/L   Comprehensive Metobolic Panel (BFP)     Status: None   Result Value Ref Range    Carbon Dioxide 30.0 20 - 32 mmol/L    Creatinine 0.72 0.60 - 1.30 mg/dL    Glucose 94 60 - 99 mg/dL    Sodium 141.6 135 - 146 mmol/L    Potassium 4.23 3.5 - 5.3 mmol/L    Chloride 104.7 98 - 110 mmol/L    Protein Total 7.1 6.1 - 8.1 g/dL    Albumin 4.4 3.6 - 5.1 g/dL    Alkaline Phosphatase 76 33 - 130 U/L    ALT 26 0 - 32 U/L    AST 17 0 - 35 U/L    Bilirubin Total 0.6 0.2 - 1.2 mg/dL    Urea Nitrogen 15 7 - 25 mg/dL    Calcium 9.7 8.6 - 10.3 mg/dL    BUN/Creatinine Ratio 20.8 6 - 32    Globulin Calculated 2.7 1.9 - 3.7    A/G Ratio 1.6 1 - 2.5

## 2023-12-23 LAB
T4, FREE, NON-DIALYSIS - QUEST: 0.9 NG/DL (ref 0.8–1.8)
TSH SERPL-ACNC: 0.93 MIU/L (ref 0.4–4.5)

## 2024-02-10 ENCOUNTER — HEALTH MAINTENANCE LETTER (OUTPATIENT)
Age: 64
End: 2024-02-10

## 2024-05-15 ENCOUNTER — NURSE TRIAGE (OUTPATIENT)
Dept: NURSING | Facility: CLINIC | Age: 64
End: 2024-05-15
Payer: COMMERCIAL

## 2024-05-15 NOTE — TELEPHONE ENCOUNTER
Has had this before, chest pain like an anxiety attack.    Pain had been on right side of chest.    Now, pain is on left side of chest. Jaw also hurts, bilateral L> R.  Per the protocol, I advised 911.  Patient asked if she could just come in and have an EKG done.  I again advised her to call 911.  Pretty stated understanding of disposition. She did not agree to calling 911. She ended the call.      Reason for Disposition   Chest pain lasting longer than 5 minutes and ANY of the following:         Pain is crushing, pressure-like, or heavy         Over 44 years old          Over 30 years old and one cardiac risk factor (e.g diabetes, high blood pressure, high cholesterol, smoker, or family history of heart disease)         History of heart disease (e.g. angina, heart attack, heart failure, bypass surgery, takes nitroglycerin)    Additional Information   Negative: SEVERE difficulty breathing (e.g., struggling for each breath, speaks in single words)   Negative: Difficult to awaken or acting confused (e.g., disoriented, slurred speech)   Negative: Shock suspected (e.g., cold/pale/clammy skin, too weak to stand, low BP, rapid pulse)   Negative: Passed out (i.e., lost consciousness, collapsed and was not responding)    Protocols used: Chest Pain-A-OH  Monae MARLEY RN [facility]

## 2024-05-29 ENCOUNTER — HOSPITAL ENCOUNTER (OUTPATIENT)
Dept: CARDIOLOGY | Facility: CLINIC | Age: 64
Discharge: HOME | End: 2024-05-29
Payer: COMMERCIAL

## 2024-05-29 DIAGNOSIS — R53.83 FATIGUE, UNSPECIFIED TYPE: ICD-10-CM

## 2024-05-29 DIAGNOSIS — R06.00 DYSPNEA, UNSPECIFIED TYPE: ICD-10-CM

## 2024-05-29 DIAGNOSIS — R00.2 PALPITATIONS: ICD-10-CM

## 2024-05-29 PROCEDURE — 999N000208 ECHO STRESS ECHOCARDIOGRAM

## 2024-05-29 PROCEDURE — 255N000002 HC RX 255 OP 636

## 2024-05-29 PROCEDURE — 93018 CV STRESS TEST I&R ONLY: CPT | Performed by: INTERNAL MEDICINE

## 2024-05-29 PROCEDURE — 93321 DOPPLER ECHO F-UP/LMTD STD: CPT | Mod: 26 | Performed by: INTERNAL MEDICINE

## 2024-05-29 PROCEDURE — 93325 DOPPLER ECHO COLOR FLOW MAPG: CPT | Mod: 26 | Performed by: INTERNAL MEDICINE

## 2024-05-29 PROCEDURE — 93350 STRESS TTE ONLY: CPT | Mod: 26 | Performed by: INTERNAL MEDICINE

## 2024-05-29 PROCEDURE — 93016 CV STRESS TEST SUPVJ ONLY: CPT | Performed by: INTERNAL MEDICINE

## 2024-05-29 RX ADMIN — HUMAN ALBUMIN MICROSPHERES AND PERFLUTREN 9 ML: 10; .22 INJECTION, SOLUTION INTRAVENOUS at 11:05

## 2024-06-17 PROBLEM — Z76.89 HEALTH CARE HOME: Status: RESOLVED | Noted: 2023-12-22 | Resolved: 2024-06-17

## 2024-06-28 ENCOUNTER — TELEPHONE (OUTPATIENT)
Dept: FAMILY MEDICINE | Facility: CLINIC | Age: 64
End: 2024-06-28
Payer: COMMERCIAL

## 2024-06-28 ENCOUNTER — NURSE TRIAGE (OUTPATIENT)
Dept: FAMILY MEDICINE | Facility: CLINIC | Age: 64
End: 2024-06-28
Payer: COMMERCIAL

## 2024-06-28 NOTE — TELEPHONE ENCOUNTER
2 encounters were sent for this patient, addressed in other one, Sent to the nurse's to be addressed

## 2024-06-28 NOTE — TELEPHONE ENCOUNTER
Reason for Call:  Appointment Request    Patient requesting this type of appt:  APPT - Hernia on left side pubic area, slightly burns, blue/purple     Requested provider:  Lazaro Barone MD    Reason patient unable to be scheduled: Not within requested timeframe    When does patient want to be seen/preferred time: 1-2 days    Comments: Pt is leaving on July 6, would like to be seen before; if possible     Could we send this information to you in [facility] or would you prefer to receive a phone call?:   Patient would prefer a phone call   Okay to leave a detailed message?: Yes at Cell number on file:    Telephone Information:   Mobile [phone]       Call taken on 6/28/2024 at 9:50 AM by Smitha Munguia
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
## 2024-06-28 NOTE — TELEPHONE ENCOUNTER
Reason for Call:  Appointment Request    Patient requesting this type of appt: Procedure: Nexplanon insert for patients granddaughter. Please give patient a call back for further information      Requested provider:  Sarita Franco MD    Reason patient unable to be scheduled: Not within requested timeframe    When does patient want to be seen/preferred time: 1-2 weeks - July 15th appointment    Comments: Ana Maria will only be in town until July 17th, so she would like it done before she leaves.William will be available to come back for follow-up appt.     Could we send this information to you in [employer] or would you prefer to receive a phone call?:   Patient would prefer a phone call   Okay to leave a detailed message?: Yes at Cell number on file:    Telephone Information:   Mobile [phone]       Call taken on 6/28/2024 at 9:34 AM by Smitha Munguia
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
## 2024-07-01 NOTE — TELEPHONE ENCOUNTER
Is the granddaughter our patient?  Please start  and encounter in her chart and routed back to me.  We are pretty booked out at this time unless I know the patient's history and we have records on her, I would not be able to help her with that device I can certainly look into what I can help with.    Sarita Franco MD  [facility], [place]

## 2024-08-23 ENCOUNTER — OFFICE VISIT (OUTPATIENT)
Dept: URGENT CARE | Facility: URGENT CARE | Age: 64
End: 2024-08-23
Payer: OTHER MISCELLANEOUS

## 2024-08-23 ENCOUNTER — ANCILLARY PROCEDURE (OUTPATIENT)
Dept: GENERAL RADIOLOGY | Facility: CLINIC | Age: 64
End: 2024-08-23
Attending: PHYSICIAN ASSISTANT
Payer: COMMERCIAL

## 2024-08-23 VITALS
OXYGEN SATURATION: 97 % | SYSTOLIC BLOOD PRESSURE: 157 MMHG | HEART RATE: 85 BPM | DIASTOLIC BLOOD PRESSURE: 97 MMHG | BODY MASS INDEX: 27.93 KG/M2 | TEMPERATURE: 98.1 F | RESPIRATION RATE: 18 BRPM | WEIGHT: 146.6 LBS

## 2024-08-23 DIAGNOSIS — J20.8 ACUTE BACTERIAL BRONCHITIS: ICD-10-CM

## 2024-08-23 DIAGNOSIS — M62.838 MUSCLE SPASM: ICD-10-CM

## 2024-08-23 DIAGNOSIS — Z91.89: Primary | ICD-10-CM

## 2024-08-23 DIAGNOSIS — R93.89 ABNORMAL CHEST X-RAY: ICD-10-CM

## 2024-08-23 DIAGNOSIS — R25.2 MUSCLE CRAMPS: ICD-10-CM

## 2024-08-23 DIAGNOSIS — B96.89 ACUTE BACTERIAL BRONCHITIS: ICD-10-CM

## 2024-08-23 LAB
ALBUMIN SERPL-MCNC: 4.1 G/DL (ref 3.4–5)
ALP SERPL-CCNC: 72 U/L (ref 40–150)
ALT SERPL W P-5'-P-CCNC: 24 U/L (ref 0–50)
ANION GAP SERPL CALCULATED.3IONS-SCNC: 6 MMOL/L (ref 3–14)
AST SERPL W P-5'-P-CCNC: 25 U/L (ref 0–45)
BASOPHILS # BLD AUTO: 0.1 10E3/UL (ref 0–0.2)
BASOPHILS NFR BLD AUTO: 1 %
BILIRUB SERPL-MCNC: 0.9 MG/DL (ref 0.2–1.3)
BUN SERPL-MCNC: 13 MG/DL (ref 7–30)
CALCIUM SERPL-MCNC: 10.3 MG/DL (ref 8.5–10.1)
CHLORIDE BLD-SCNC: 107 MMOL/L (ref 94–109)
CK SERPL-CCNC: 32 U/L (ref 26–192)
CO2 SERPL-SCNC: 32 MMOL/L (ref 20–32)
CREAT SERPL-MCNC: 0.7 MG/DL (ref 0.52–1.04)
EGFRCR SERPLBLD CKD-EPI 2021: >90 ML/MIN/1.73M2
EOSINOPHIL # BLD AUTO: 0.4 10E3/UL (ref 0–0.7)
EOSINOPHIL NFR BLD AUTO: 4 %
ERYTHROCYTE [DISTWIDTH] IN BLOOD BY AUTOMATED COUNT: 13.5 % (ref 10–15)
GLUCOSE BLD-MCNC: 102 MG/DL (ref 70–99)
HCT VFR BLD AUTO: 47.4 % (ref 35–47)
HGB BLD-MCNC: 15.6 G/DL (ref 11.7–15.7)
IMM GRANULOCYTES # BLD: 0.1 10E3/UL
IMM GRANULOCYTES NFR BLD: 1 %
LYMPHOCYTES # BLD AUTO: 2.6 10E3/UL (ref 0.8–5.3)
LYMPHOCYTES NFR BLD AUTO: 29 %
MAGNESIUM SERPL-MCNC: 2.4 MG/DL (ref 1.7–2.3)
MCH RBC QN AUTO: 29.2 PG (ref 26.5–33)
MCHC RBC AUTO-ENTMCNC: 32.9 G/DL (ref 31.5–36.5)
MCV RBC AUTO: 89 FL (ref 78–100)
MONOCYTES # BLD AUTO: 0.6 10E3/UL (ref 0–1.3)
MONOCYTES NFR BLD AUTO: 7 %
NEUTROPHILS # BLD AUTO: 5.3 10E3/UL (ref 1.6–8.3)
NEUTROPHILS NFR BLD AUTO: 59 %
PLATELET # BLD AUTO: 245 10E3/UL (ref 150–450)
POTASSIUM BLD-SCNC: 4.3 MMOL/L (ref 3.4–5.3)
PROT SERPL-MCNC: 7.6 G/DL (ref 6.8–8.8)
RBC # BLD AUTO: 5.34 10E6/UL (ref 3.8–5.2)
SODIUM SERPL-SCNC: 145 MMOL/L (ref 135–145)
WBC # BLD AUTO: 9 10E3/UL (ref 4–11)

## 2024-08-23 PROCEDURE — 36415 COLL VENOUS BLD VENIPUNCTURE: CPT | Performed by: PHYSICIAN ASSISTANT

## 2024-08-23 PROCEDURE — 82550 ASSAY OF CK (CPK): CPT | Performed by: PHYSICIAN ASSISTANT

## 2024-08-23 PROCEDURE — 83735 ASSAY OF MAGNESIUM: CPT | Performed by: PHYSICIAN ASSISTANT

## 2024-08-23 PROCEDURE — 71046 X-RAY EXAM CHEST 2 VIEWS: CPT | Mod: TC | Performed by: RADIOLOGY

## 2024-08-23 PROCEDURE — 80053 COMPREHEN METABOLIC PANEL: CPT | Performed by: PHYSICIAN ASSISTANT

## 2024-08-23 PROCEDURE — 85025 COMPLETE CBC W/AUTO DIFF WBC: CPT | Performed by: PHYSICIAN ASSISTANT

## 2024-08-23 PROCEDURE — 99214 OFFICE O/P EST MOD 30 MIN: CPT | Performed by: PHYSICIAN ASSISTANT

## 2024-08-23 RX ORDER — AZITHROMYCIN 250 MG/1
TABLET, FILM COATED ORAL
Qty: 6 TABLET | Refills: 0 | Status: SHIPPED | OUTPATIENT
Start: 2024-08-23 | End: 2024-08-28
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
## 2024-08-26 ENCOUNTER — TRANSFERRED RECORDS (OUTPATIENT)
Dept: HEALTH INFORMATION MANAGEMENT | Facility: CLINIC | Age: 64
End: 2024-08-26
Payer: COMMERCIAL

## 2024-08-28 ENCOUNTER — TRANSFERRED RECORDS (OUTPATIENT)
Dept: HEALTH INFORMATION MANAGEMENT | Facility: CLINIC | Age: 64
End: 2024-08-28
Payer: COMMERCIAL

## 2024-10-06 ENCOUNTER — ANCILLARY PROCEDURE (OUTPATIENT)
Dept: GENERAL RADIOLOGY | Facility: CLINIC | Age: 64
End: 2024-10-06
Attending: FAMILY MEDICINE
Payer: COMMERCIAL

## 2024-10-06 ENCOUNTER — OFFICE VISIT (OUTPATIENT)
Dept: URGENT CARE | Facility: URGENT CARE | Age: 64
End: 2024-10-06
Payer: COMMERCIAL

## 2024-10-06 VITALS
OXYGEN SATURATION: 97 % | BODY MASS INDEX: 27.85 KG/M2 | WEIGHT: 146.2 LBS | DIASTOLIC BLOOD PRESSURE: 80 MMHG | SYSTOLIC BLOOD PRESSURE: 117 MMHG | TEMPERATURE: 98.1 F | HEART RATE: 87 BPM | RESPIRATION RATE: 12 BRPM
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
## 2024-10-06 DIAGNOSIS — S20.219A CONTUSION OF CHEST WALL, UNSPECIFIED LATERALITY, INITIAL ENCOUNTER: ICD-10-CM

## 2024-10-06 DIAGNOSIS — R07.89 CHEST WALL PAIN: Primary | ICD-10-CM

## 2024-10-06 PROCEDURE — 71046 X-RAY EXAM CHEST 2 VIEWS: CPT | Mod: TC | Performed by: RADIOLOGY

## 2024-10-06 PROCEDURE — 71120 X-RAY EXAM BREASTBONE 2/>VWS: CPT | Mod: TC | Performed by: RADIOLOGY

## 2024-10-06 PROCEDURE — 99213 OFFICE O/P EST LOW 20 MIN: CPT | Performed by: FAMILY MEDICINE

## 2024-10-06 ASSESSMENT — PAIN SCALES - GENERAL: PAINLEVEL: MODERATE PAIN (4)
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
## 2024-10-06 NOTE — PATIENT INSTRUCTIONS
Continue ibuprofen orally     Tylenol ( acetaminophen ) orally , max of 3000 mg in 24 hours     Use topical diclofenac cream / gel as needed    Activity as tolerated    Follow up in clinic     Be seen promptly if symptoms acutely worsen

## 2024-10-07 ENCOUNTER — TELEPHONE (OUTPATIENT)
Dept: FAMILY MEDICINE | Facility: CLINIC | Age: 64
End: 2024-10-07
Payer: COMMERCIAL

## 2024-10-07 NOTE — CONFIDENTIAL NOTE
I called patient with radiology report on sternum    Discussed in detail    Follow up in primary clinic if symptoms not improving significantly     Demian Henry MD
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
## 2024-10-08 ENCOUNTER — TELEPHONE (OUTPATIENT)
Dept: WOUND CARE | Facility: CLINIC | Age: 64
End: 2024-10-08
Payer: COMMERCIAL

## 2024-10-08 ENCOUNTER — OFFICE VISIT (OUTPATIENT)
Dept: FAMILY MEDICINE | Facility: CLINIC | Age: 64
End: 2024-10-08
Payer: COMMERCIAL

## 2024-10-08 VITALS
DIASTOLIC BLOOD PRESSURE: 73 MMHG | RESPIRATION RATE: 11 BRPM | BODY MASS INDEX: 28.86 KG/M2 | HEIGHT: 60 IN | HEART RATE: 87 BPM | TEMPERATURE: 97 F | SYSTOLIC BLOOD PRESSURE: 114 MMHG | WEIGHT: 147 LBS | OXYGEN SATURATION: 97 %

## 2024-10-08 DIAGNOSIS — R07.89 CHEST WALL PAIN: ICD-10-CM

## 2024-10-08 DIAGNOSIS — L03.114 CELLULITIS OF LEFT FOREARM: ICD-10-CM
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
## 2024-10-08 PROCEDURE — 99215 OFFICE O/P EST HI 40 MIN: CPT | Performed by: INTERNAL MEDICINE

## 2024-10-08 RX ORDER — CYCLOBENZAPRINE HCL 5 MG
5 TABLET ORAL
Qty: 30 TABLET | Refills: 1 | Status: SHIPPED | OUTPATIENT
Start: 2024-10-08 | End: 2024-11-13

## 2024-10-08 RX ORDER — METHYLPREDNISOLONE 4 MG/1
TABLET ORAL
COMMUNITY
Start: 2024-08-06 | End: 2024-10-08

## 2024-10-08 RX ORDER — IBUPROFEN 600 MG/1
1 TABLET, FILM COATED ORAL
COMMUNITY
Start: 2024-10-04 | End: 2024-11-13

## 2024-10-08 RX ORDER — ACYCLOVIR 400 MG/1
1 TABLET ORAL
COMMUNITY
Start: 2024-03-31 | End: 2024-10-08

## 2024-10-08 RX ORDER — DOXYCYCLINE HYCLATE 100 MG
100 TABLET ORAL 2 TIMES DAILY
Qty: 14 TABLET | Refills: 0 | Status: SHIPPED | OUTPATIENT
Start: 2024-10-08 | End: 2024-11-13

## 2024-10-08 RX ORDER — ACETAMINOPHEN 500 MG
500 TABLET ORAL
COMMUNITY
Start: 2024-10-01 | End: 2024-11-13

## 2024-10-08 RX ORDER — CYCLOBENZAPRINE HCL 5 MG
TABLET ORAL
COMMUNITY
Start: 2024-08-06 | End: 2024-10-08

## 2024-10-08 ASSESSMENT — PAIN SCALES - GENERAL: PAINLEVEL: NO PAIN (0)

## 2024-10-08 NOTE — TELEPHONE ENCOUNTER
Consult received via Work[employer] from Caroline Paige MD in [facility] FP/IM/PEDS  for wound of the elbow and forearm    Does the patient have an open and draining wound? Yes    Please schedule with Ester Wilhelm PA-C, Octavia Alexis NP, or Leopoldo York M.D. at [facility] for next available appointment.    **For all providers, please schedule a follow up 4 weeks after initial appointment. (2-3 weeks for Dr. Lee and Dr. Zaldivar)**  --If unable to schedule within 2-3 weeks then please place on cancellation list--    Is the patient able to make their own medical decisions? Yes    Can the patient be scheduled on a Thursday (Vanesa/Chela (starting in November))? Yes    Is patient a RISA lift? PLEASE INQUIRE WHEN MAKING THE APPOINTMENT AND PUT IN APPOINTMENT NOTES    Routing to  Wound Healing Scheduling.
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
## 2024-10-08 NOTE — LETTER
October 8, 2024      Pretty Parker  [address] [facility] [id number]        To Whom It May Concern:    Pretty Parker was seen in our clinic. She may return to work with the following: limited to 6 hour workday, limited to light duty - at , affected area must be kept clean and dry, and must be able to take a break for 15 mins every 2 hrs to move position on or about for next 1 week till 10/15/2024.       Sincerely,    Caroline Paige

## 2024-10-08 NOTE — PATIENT INSTRUCTIONS
As discussed , will need a good antibiotic for your forearm cellulitis and meticulous wound care to avoid any worsening infection     Will work letter as requested.     ===================

## 2024-10-19 ENCOUNTER — MYC MEDICAL ADVICE (OUTPATIENT)
Dept: FAMILY MEDICINE | Facility: CLINIC | Age: 64
End: 2024-10-19
Payer: COMMERCIAL

## 2024-10-21 ENCOUNTER — TELEPHONE (OUTPATIENT)
Dept: FAMILY MEDICINE | Facility: CLINIC | Age: 64
End: 2024-10-21
Payer: COMMERCIAL

## 2024-10-21 ENCOUNTER — TRANSFERRED RECORDS (OUTPATIENT)
Dept: HEALTH INFORMATION MANAGEMENT | Facility: CLINIC | Age: 64
End: 2024-10-21
Payer: COMMERCIAL

## 2024-10-21 NOTE — TELEPHONE ENCOUNTER
Reason for Call:  Form, our goal is to have forms completed with 72 hours, however, some forms may require a visit or additional information.    Type of letter, form or note:  FMLA    Who is the form from?:  Not sure (if other please explain)    Where did the form come from: Patient or family brought in       What clinic location was the form placed at?: [facility]    Where the form was placed:  [address]  Dr Paige Box/Folder    What number is listed as a contact on the form?:[phone]       Additional comments: Pt would like to  at appt 10/23     Call taken on 10/21/2024 at 5:40 PM by Franny Alcaraz

## 2024-10-22 NOTE — TELEPHONE ENCOUNTER
Please schedule a virtual visit with the patient, okay to double book at 11:30 AM on any of the days this week  - for helping on filling this FMLA paperwork she is requesting.    Thank you  Caroline Paige MD on 10/22/2024

## 2024-10-23 ENCOUNTER — MYC MEDICAL ADVICE (OUTPATIENT)
Dept: FAMILY MEDICINE | Facility: CLINIC | Age: 64
End: 2024-10-23
Payer: COMMERCIAL

## 2024-10-23 ENCOUNTER — OFFICE VISIT (OUTPATIENT)
Dept: FAMILY MEDICINE | Facility: CLINIC | Age: 64
End: 2024-10-23
Payer: COMMERCIAL

## 2024-10-23 ENCOUNTER — TELEPHONE (OUTPATIENT)
Dept: FAMILY MEDICINE | Facility: CLINIC | Age: 64
End: 2024-10-23
Payer: COMMERCIAL

## 2024-10-23 VITALS
DIASTOLIC BLOOD PRESSURE: 89 MMHG | SYSTOLIC BLOOD PRESSURE: 138 MMHG | RESPIRATION RATE: 16 BRPM | BODY MASS INDEX: 29.22 KG/M2 | TEMPERATURE: 97.2 F | WEIGHT: 149.6 LBS | OXYGEN SATURATION: 94 % | HEART RATE: 83 BPM
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
## 2024-10-23 DIAGNOSIS — Z02.89 ENCOUNTER FOR COMPLETION OF FORM WITH PATIENT: ICD-10-CM

## 2024-10-23 DIAGNOSIS — S40.812D: ICD-10-CM

## 2024-10-23 DIAGNOSIS — S22.20XD CLOSED FRACTURE OF STERNUM WITH ROUTINE HEALING, UNSPECIFIED PORTION OF STERNUM, SUBSEQUENT ENCOUNTER: ICD-10-CM

## 2024-10-23 PROCEDURE — 99213 OFFICE O/P EST LOW 20 MIN: CPT | Performed by: INTERNAL MEDICINE

## 2024-10-23 RX ORDER — ACYCLOVIR 400 MG/1
1 TABLET ORAL 3 TIMES DAILY
COMMUNITY
Start: 2024-03-31 | End: 2024-11-13

## 2024-10-23 RX ORDER — VALACYCLOVIR HYDROCHLORIDE 1 G/1
TABLET, FILM COATED ORAL
COMMUNITY
Start: 2024-10-08 | End: 2024-11-13

## 2024-10-23 ASSESSMENT — PAIN SCALES - GENERAL: PAINLEVEL_OUTOF10: NO PAIN (0)

## 2024-10-23 NOTE — TELEPHONE ENCOUNTER
FMLA Forms signed and placed in my out box.  Please scan in patient's chart before doing the needful >> and confirm the pt after faxing it ( OK for a mychart message ) .    Thank you,  Caroline Paige MD on 10/23/2024 at 3:40 PM
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
## 2024-10-23 NOTE — TELEPHONE ENCOUNTER
Form completed and signed by Dr. Paige.    LVM [facility] - Called pt to confirm form completed.   Sent form to pt via [address].   Does pt want original form back?      Form faxed to [facility] and filed team 3.     Antonia Randle on 10/23/2024 at 5:54 PM

## 2024-10-29 NOTE — TELEPHONE ENCOUNTER
Patient called to discuss possibly rescheduling for a sooner opening this week, states she is open Wednesday and Friday,    [phone]

## 2024-10-29 NOTE — TELEPHONE ENCOUNTER
Returned call to patient and informed her that there were no openings sooner than her 11/13/24 appointment. Patient was then added to the cancellation list. Since speaking with the patient, an appointment because available for tomorrow 10/30/24 at 9 am. Writer called the patient and left a vm offering the appointment time.

## 2024-10-30 ENCOUNTER — HOSPITAL ENCOUNTER (OUTPATIENT)
Dept: WOUND CARE | Facility: CLINIC | Age: 64
Discharge: HOME | End: 2024-10-30
Attending: FAMILY MEDICINE | Admitting: FAMILY MEDICINE
Payer: COMMERCIAL

## 2024-10-30 ENCOUNTER — TELEPHONE (OUTPATIENT)
Dept: FAMILY MEDICINE | Facility: CLINIC | Age: 64
End: 2024-10-30
Payer: COMMERCIAL

## 2024-10-30 VITALS
DIASTOLIC BLOOD PRESSURE: 80 MMHG | TEMPERATURE: 97.8 F | WEIGHT: 146.6 LBS | SYSTOLIC BLOOD PRESSURE: 148 MMHG | BODY MASS INDEX: 27.68 KG/M2 | HEIGHT: 61 IN | HEART RATE: 81 BPM

## 2024-10-30 DIAGNOSIS — L98.492 ARM ULCER, WITH FAT LAYER EXPOSED (H): ICD-10-CM

## 2024-10-30 DIAGNOSIS — S40.812D: Primary | ICD-10-CM

## 2024-10-30 PROCEDURE — G0463 HOSPITAL OUTPT CLINIC VISIT: HCPCS | Mod: 25

## 2024-10-30 PROCEDURE — 97602 WOUND(S) CARE NON-SELECTIVE: CPT

## 2024-10-30 PROCEDURE — 99204 OFFICE O/P NEW MOD 45 MIN: CPT | Performed by: FAMILY MEDICINE
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
## 2024-10-30 NOTE — PROGRESS NOTES
Patient arrived for wound care visit. Certified Wound Care Nurse time spent evaluating patient record, completed a full evaluation and documented wound(s) & erika-wound skin; provided recommendation based on treatment plan. Applied dressing, reviewed discharge instructions, patient education, and discussed plan of care with appropriate medical team staff members and patient and/or family members.

## 2024-10-30 NOTE — DISCHARGE INSTRUCTIONS
10/30/2024   Pretty Parker   1960    A DME order for supplies has been placed to [email or web address]. If there are any issues with your order including not receiving the order please call [employer] at [phone]. They can also provide a tracking number for you.         Dressing changes outside of clinic are being performed by Patient    Plan 10/30/2024   -Once there is no drainage noted on the bandages you can stop the dressings.  -Follow up with us as needed  -If there is glass of asphalt you body will push it out    Wound Dressing Change: Left lower Arm   -Wash your hands with soap and water before you begin your dressing change and prepare a clean surface for dressings.  -Cleanse with mild unscented soap and water (such as Cetaphil, Cerave or Dove)   -Primary dressing: Apply 1 6x6 Zetuvit silicone plus border   -Change Daily   -Once there is no drainage noted on the bandages you can stop the dressings.    A diet high in protein is important for wound healing, we recommend getting 90 grams of protein per day. Taking protein shakes or bars are a good way to get extra protein in your diet.     Good sources of protein:  Pork 26g per 3 oz  Whey protein powder - 24g per scoop (on average)  Greek yogurt - 23g per 8oz   Chicken or Turkey - 23g per 3oz  Fish - 20-25g per 3oz  Beef - 18-23g per 3oz  Tofu - 10g per 1/2 cup  Navy beans - 20g per cup  Cottage cheese - 14g per 1/2 cup   Lentils - 13g per 1/4 cup  Beef jerky 13g per 1oz  2% milk - 8g per cup  Peanut butter - 8g per 2 tablespoons  Eggs - 6g per egg  Mixed nuts - 6g per 2oz       Main Provider: Leopoldo York M.D. October 30, 2024    Call us at [phone] if you have any questions about your wounds, if you have redness or swelling around your wound, have a fever of 101 degrees Fahrenheit or greater or if you have any other problems or concerns. We answer the phone Monday through Friday 8 am to 4 pm, please leave a message as we check the voicemail  frequently throughout the day. If you have a concern over the weekend, please leave a message and we will return your call Monday. If the need is urgent, go to the ER or urgent care.    If you had a positive experience please indicate that on your patient satisfaction survey form that [facility] will be sending you.    It was a pleasure meeting with you today.  Thank you for allowing me and my team the privilege of caring for you today.  YOU are the reason we are here, and I truly hope we provided you with the excellent service you deserve.  Please let us know if there is anything else we can do for you so that we can be sure you are leaving completely satisfied with your care experience.      If you have any billing related questions please call the [facility] Business office at [phone]. The clinic staff does not handle billing related matters.    If you are scheduled to have a follow up appointment, you will receive a reminder call the day before your visit. On the appointment day please arrive 15 minutes prior to your appointment time. If you are unable to keep that appointment, please call the clinic to cancel or reschedule. If you are more than 10 minutes late or greater for your scheduled appointment time, the clinic policy is that you may be asked to reschedule.

## 2024-10-30 NOTE — TELEPHONE ENCOUNTER
Forms/Letter Request    Type of form/letter: [employer]     Do we have the form/letter: Yes: Red folder placed in Dr Paige's inbox        How would you like the form/letter returned: Fax : [phone]

## 2024-11-02 NOTE — TELEPHONE ENCOUNTER
Please call the pt and clarify regarding this .     We have already filled a different set of forms in pt recent OV with us >> But on a different encounter I see TC has faxed them to [employer] facility as per her documentation on that encounter.     Now I received another different set of [employer] forms >> 2 questions to clarify by calling her .    So does pt wants this new form sent at all ? Or not needed.  Does she wants same dates as mentioned on the previous form ? I am happy to fill it off.           IF SHE EXPRESSES ANY CHANGES - we will need a VV for completion of new form. ( OK to double book at 11.30 AM any of the days )     Thank you,  Caroline Paige MD on 11/1/2024

## 2024-11-04 ENCOUNTER — MYC MEDICAL ADVICE (OUTPATIENT)
Dept: FAMILY MEDICINE | Facility: CLINIC | Age: 64
End: 2024-11-04
Payer: COMMERCIAL

## 2024-11-06 NOTE — TELEPHONE ENCOUNTER
As per the review of conversation of my TC and the patient I do not think this new forms is required.    FYI -as per my past experiences all these additional forms were started that patient stating she does not require.  I will await for patient to confirm me that if she really needs them then I will be filling them.    For now I feel it is not required.     Thank you  Caroline Paige MD on 11/5/2024

## 2024-11-11 NOTE — TELEPHONE ENCOUNTER
LVM confirming appt 11/13.  If anything else is needed, please call or send a new [employer] message.    Antonia Randle on 11/11/2024 at 5:43 PM

## 2024-11-11 NOTE — TELEPHONE ENCOUNTER
Triage outreach    Attempt number 1    Left message to call back at [phone].    Hanny RN  [facility]

## 2024-11-11 NOTE — TELEPHONE ENCOUNTER
I think this is taken care already . Pt wanted to see me this week - And I am seeing her on 11/13. Please let me know if I am missing anything here?  Or are they asking to get in sooner than this?

## 2024-11-13 ENCOUNTER — OFFICE VISIT (OUTPATIENT)
Dept: FAMILY MEDICINE | Facility: CLINIC | Age: 64
End: 2024-11-13
Payer: COMMERCIAL

## 2024-11-13 VITALS
OXYGEN SATURATION: 98 % | RESPIRATION RATE: 15 BRPM | BODY MASS INDEX: 27.56 KG/M2 | WEIGHT: 146 LBS | TEMPERATURE: 97.5 F | HEART RATE: 98 BPM | SYSTOLIC BLOOD PRESSURE: 102 MMHG | DIASTOLIC BLOOD PRESSURE: 74 MMHG | HEIGHT: 61 IN

## 2024-11-13 DIAGNOSIS — K59.00 CONSTIPATION, UNSPECIFIED CONSTIPATION TYPE: ICD-10-CM

## 2024-11-13 DIAGNOSIS — G47.00 INSOMNIA, UNSPECIFIED TYPE: ICD-10-CM

## 2024-11-13 DIAGNOSIS — K57.30 DIVERTICULOSIS OF LARGE INTESTINE WITHOUT HEMORRHAGE: ICD-10-CM

## 2024-11-13 DIAGNOSIS — K21.9 GASTROESOPHAGEAL REFLUX DISEASE, UNSPECIFIED WHETHER ESOPHAGITIS PRESENT: ICD-10-CM

## 2024-11-13 DIAGNOSIS — R10.32 ACUTE LEFT LOWER QUADRANT PAIN: ICD-10-CM

## 2024-11-13 DIAGNOSIS — R10.32 LEFT INGUINAL PAIN: ICD-10-CM

## 2024-11-13 DIAGNOSIS — Z00.00 ROUTINE GENERAL MEDICAL EXAMINATION AT A HEALTH CARE FACILITY: Primary | ICD-10-CM

## 2024-11-13 DIAGNOSIS — K44.9 HIATAL HERNIA: ICD-10-CM

## 2024-11-13 DIAGNOSIS — Z12.11 SCREEN FOR COLON CANCER: ICD-10-CM

## 2024-11-13 DIAGNOSIS — E78.00 PURE HYPERCHOLESTEROLEMIA: ICD-10-CM

## 2024-11-13 PROCEDURE — 99215 OFFICE O/P EST HI 40 MIN: CPT | Mod: 25 | Performed by: INTERNAL MEDICINE

## 2024-11-13 PROCEDURE — 99396 PREV VISIT EST AGE 40-64: CPT | Performed by: INTERNAL MEDICINE

## 2024-11-13 RX ORDER — TRAZODONE HYDROCHLORIDE 50 MG/1
50 TABLET, FILM COATED ORAL
Qty: 30 TABLET | Refills: 1 | Status: SHIPPED | OUTPATIENT
Start: 2024-11-13

## 2024-11-13 RX ORDER — DOCUSATE SODIUM 100 MG/1
100 CAPSULE, LIQUID FILLED ORAL 2 TIMES DAILY
Qty: 60 CAPSULE | Refills: 1 | Status: SHIPPED | OUTPATIENT
Start: 2024-11-13

## 2024-11-13 RX ORDER — METRONIDAZOLE 500 MG/1
500 TABLET ORAL 3 TIMES DAILY
Qty: 30 TABLET | Refills: 0 | Status: SHIPPED | OUTPATIENT
Start: 2024-11-13 | End: 2024-11-23

## 2024-11-13 RX ORDER — POLYETHYLENE GLYCOL 3350 17 G/17G
1 POWDER, FOR SOLUTION ORAL DAILY
Qty: 500 G | Refills: 2 | Status: SHIPPED | OUTPATIENT
Start: 2024-11-13

## 2024-11-13 RX ORDER — LEVOFLOXACIN 750 MG/1
750 TABLET, FILM COATED ORAL DAILY
Qty: 10 TABLET | Refills: 0 | Status: SHIPPED | OUTPATIENT
Start: 2024-11-13

## 2024-11-13 ASSESSMENT — PAIN SCALES - GENERAL: PAINLEVEL_OUTOF10: MODERATE PAIN (4)

## 2024-11-13 NOTE — PROGRESS NOTES
"  {PROVIDER CHARTING PREFERENCE:258062}    Jeremi Olsen is a 64 year old, presenting for the following health issues:  Constipation        11/13/2024     9:50 AM   Additional Questions   Roomed by Vonda     History of Present Illness       Reason for visit:  Possible hernia/ heavy constipation  Symptom onset:  1-2 weeks ago  Symptoms include:  Left intestinal pain/ constipation  Symptom intensity:  Severe  Symptom progression:  Improving  Had these symptoms before:  No   She is taking medications regularly.       {MA/LPN/RN Pre-Provider Visit Orders- hCG/UA/Strep (Optional):751013}  Constipation  Onset/Duration: 3-4 weeks  Description:  Frequency of bowel movements: on an almost daily basis  Consistency of stool: narrow and soft  Progression of Symptoms: worsening  Accompanying signs and symptoms:    Abdominal pain: YES   Rectal pain: No   Blood in stool: No   Nausea/Vomiting: No   Weight loss or gain: No  History:   Similar problems in past: YES  History of abdominal surgery: No  Chronic laxative use: No  New medications: No  Precipitating or alleviating factors: none  Therapies tried and outcome: Miralax  {additonal problems for provider to add (Optional):893462}    {ROS Picklists (Optional):862119}      Objective    /74   Pulse 98   Temp 97.5  F (36.4  C) (Temporal)   Resp 15   Ht 1.54 m (5' 0.63\")   Wt 66.2 kg (146 lb)   LMP  (LMP Unknown)   SpO2 98%   BMI 27.92 kg/m    Body mass index is 27.92 kg/m .  Physical Exam   {Exam List (Optional):614088}    {Diagnostic Test Results (Optional):411192}        Signed Electronically by: Caroline Paige MD  {Email feedback regarding this note to [email or web address]   :959919}  "

## 2024-11-13 NOTE — PATIENT INSTRUCTIONS
As discussed, please do fasting LAB only appointment     Will start on medications for your Constipation   Possible Diverticulitis - Antibiotics  Sleep medication   Referral to Endoscopy and Colonoscopy  US left inguinal region - to exclude hernia    ============================      Patient Education   Preventive Care Advice   This is general advice given by our system to help you stay healthy. However, your care team may have specific advice just for you. Please talk to your care team about your preventive care needs.  Nutrition  Eat 5 or more servings of fruits and vegetables each day.  Try wheat bread, brown rice and whole grain pasta (instead of white bread, rice, and pasta).  Get enough calcium and vitamin D. Check the label on foods and aim for 100% of the RDA (recommended daily allowance).  Lifestyle  Exercise at least 150 minutes each week  (30 minutes a day, 5 days a week).  Do muscle strengthening activities 2 days a week. These help control your weight and prevent disease.  No smoking.  Wear sunscreen to prevent skin cancer.  Have a dental exam and cleaning every 6 months.  Yearly exams  See your health care team every year to talk about:  Any changes in your health.  Any medicines your care team has prescribed.  Preventive care, family planning, and ways to prevent chronic diseases.  Shots (vaccines)   HPV shots (up to age 26), if you've never had them before.  Hepatitis B shots (up to age 59), if you've never had them before.  COVID-19 shot: Get this shot when it's due.  Flu shot: Get a flu shot every year.  Tetanus shot: Get a tetanus shot every 10 years.  Pneumococcal, hepatitis A, and RSV shots: Ask your care team if you need these based on your risk.  Shingles shot (for age 50 and up)  General health tests  Diabetes screening:  Starting at age 35, Get screened for diabetes at least every 3 years.  If you are younger than age 35, ask your care team if you should be screened for  diabetes.  Cholesterol test: At age 39, start having a cholesterol test every 5 years, or more often if advised.  Bone density scan (DEXA): At age 50, ask your care team if you should have this scan for osteoporosis (brittle bones).  Hepatitis C: Get tested at least once in your life.  STIs (sexually transmitted infections)  Before age 24: Ask your care team if you should be screened for STIs.  After age 24: Get screened for STIs if you're at risk. You are at risk for STIs (including HIV) if:  You are sexually active with more than one person.  You don't use condoms every time.  You or a partner was diagnosed with a sexually transmitted infection.  If you are at risk for HIV, ask about PrEP medicine to prevent HIV.  Get tested for HIV at least once in your life, whether you are at risk for HIV or not.  Cancer screening tests  Cervical cancer screening: If you have a cervix, begin getting regular cervical cancer screening tests starting at age 21.  Breast cancer scan (mammogram): If you've ever had breasts, begin having regular mammograms starting at age 40. This is a scan to check for breast cancer.  Colon cancer screening: It is important to start screening for colon cancer at age 45.  Have a colonoscopy test every 10 years (or more often if you're at risk) Or, ask your provider about stool tests like a FIT test every year or Cologuard test every 3 years.  To learn more about your testing options, visit:   .  For help making a decision, visit:   https://bit.ly/dv24087.  Prostate cancer screening test: If you have a prostate, ask your care team if a prostate cancer screening test (PSA) at age 55 is right for you.  Lung cancer screening: If you are a current or former smoker ages 50 to 80, ask your care team if ongoing lung cancer screenings are right for you.  For informational purposes only. Not to replace the advice of your health care provider. Copyright   2023 [employer]. All rights reserved.  Clinically reviewed by the [facility] Transitions Program. [employer] 416831 - REV 01/24.
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
## 2024-11-15 ENCOUNTER — LAB (OUTPATIENT)
Dept: LAB | Facility: CLINIC | Age: 64
End: 2024-11-15
Payer: COMMERCIAL

## 2024-11-15 DIAGNOSIS — E78.00 PURE HYPERCHOLESTEROLEMIA: ICD-10-CM

## 2024-11-15 DIAGNOSIS — Z00.00 ROUTINE GENERAL MEDICAL EXAMINATION AT A HEALTH CARE FACILITY: ICD-10-CM

## 2024-11-15 DIAGNOSIS — K21.9 GASTROESOPHAGEAL REFLUX DISEASE, UNSPECIFIED WHETHER ESOPHAGITIS PRESENT: ICD-10-CM

## 2024-11-15 LAB
ALBUMIN SERPL BCG-MCNC: 4.4 G/DL (ref 3.5–5.2)
ALP SERPL-CCNC: 88 U/L (ref 40–150)
ALT SERPL W P-5'-P-CCNC: 17 U/L (ref 0–50)
ANION GAP SERPL CALCULATED.3IONS-SCNC: 11 MMOL/L (ref 7–15)
AST SERPL W P-5'-P-CCNC: 20 U/L (ref 0–45)
BILIRUB SERPL-MCNC: 0.4 MG/DL
BUN SERPL-MCNC: 17.5 MG/DL (ref 8–23)
CALCIUM SERPL-MCNC: 9.8 MG/DL (ref 8.8–10.4)
CHLORIDE SERPL-SCNC: 105 MMOL/L (ref 98–107)
CHOLEST SERPL-MCNC: 332 MG/DL
CREAT SERPL-MCNC: 0.71 MG/DL (ref 0.51–0.95)
EGFRCR SERPLBLD CKD-EPI 2021: >90 ML/MIN/1.73M2
ERYTHROCYTE [DISTWIDTH] IN BLOOD BY AUTOMATED COUNT: 13.7 % (ref 10–15)
FASTING STATUS PATIENT QL REPORTED: YES
FASTING STATUS PATIENT QL REPORTED: YES
GLUCOSE SERPL-MCNC: 97 MG/DL (ref 70–99)
HCO3 SERPL-SCNC: 27 MMOL/L (ref 22–29)
HCT VFR BLD AUTO: 44.6 % (ref 35–47)
HDLC SERPL-MCNC: 77 MG/DL
HGB BLD-MCNC: 14.5 G/DL (ref 11.7–15.7)
LDLC SERPL CALC-MCNC: 230 MG/DL
MCH RBC QN AUTO: 30 PG (ref 26.5–33)
MCHC RBC AUTO-ENTMCNC: 32.5 G/DL (ref 31.5–36.5)
MCV RBC AUTO: 92 FL (ref 78–100)
NONHDLC SERPL-MCNC: 255 MG/DL
PLATELET # BLD AUTO: 242 10E3/UL (ref 150–450)
POTASSIUM SERPL-SCNC: 4 MMOL/L (ref 3.4–5.3)
PROT SERPL-MCNC: 7.1 G/DL (ref 6.4–8.3)
RBC # BLD AUTO: 4.83 10E6/UL (ref 3.8–5.2)
SODIUM SERPL-SCNC: 143 MMOL/L (ref 135–145)
TRIGL SERPL-MCNC: 124 MG/DL
WBC # BLD AUTO: 6.3 10E3/UL (ref 4–11)

## 2024-11-20 ENCOUNTER — ANCILLARY PROCEDURE (OUTPATIENT)
Dept: ULTRASOUND IMAGING | Facility: CLINIC | Age: 64
End: 2024-11-20
Attending: INTERNAL MEDICINE
Payer: COMMERCIAL

## 2024-11-20 DIAGNOSIS — R10.32 LEFT INGUINAL PAIN: ICD-10-CM

## 2024-11-20 PROCEDURE — 76705 ECHO EXAM OF ABDOMEN: CPT

## 2024-11-21 ENCOUNTER — TELEPHONE (OUTPATIENT)
Dept: GASTROENTEROLOGY | Facility: CLINIC | Age: 64
End: 2024-11-21
Payer: COMMERCIAL

## 2024-11-21 NOTE — TELEPHONE ENCOUNTER
"Endoscopy Scheduling Screen    Have you had any respiratory illness or flu-like symptoms in the last 10 days?  No    What is your communication preference for Instructions and/or Bowel Prep?   MyChart    What insurance is in the chart?  Other:  [employer]    Ordering/Referring Provider:   ROGER ORONA        (If ordering provider performs procedure, schedule with ordering provider unless otherwise instructed. )    BMI: Estimated body mass index is 27.92 kg/m  as calculated from the following:    Height as of 11/13/24: 1.54 m (5' 0.63\").    Weight as of 11/13/24: 66.2 kg (146 lb).     Sedation Ordered  moderate sedation.   If patient BMI > 50 do not schedule in [facility].    If patient BMI > 45 do not schedule at [facility].    Are you taking methadone or Suboxone?  NO, No RN review required.    Have you been diagnosed and are being treated for severe PTSD or severe anxiety?  NO, No RN review required.    Are you taking any prescription medications for pain 3 or more times per week?   NO, No RN review required.    Do you have a history of malignant hyperthermia?  No    (Females) Are you currently pregnant?   No     Have you been diagnosed or told you have pulmonary hypertension?   No    Do you have an LVAD?  No    Have you been told you have moderate to severe sleep apnea?  No.    Have you been told you have COPD, asthma, or any other lung disease?  No    Do you have any heart conditions?  No     Have you ever had or are you waiting for an organ transplant?  No. Continue scheduling, no site restrictions.    Have you had a stroke or transient ischemic attack (TIA aka \"mini stroke\" in the last 6 months?   No    Have you been diagnosed with or been told you have cirrhosis of the liver?   No.    Are you currently on dialysis?   No    Do you need assistance transferring?   No    BMI: Estimated body mass index is 27.92 kg/m  as calculated from the following:    Height as of 11/13/24: 1.54 m (5' 0.63\").    Weight as of " 11/13/24: 66.2 kg (146 lb).     Is patients BMI > 40 and scheduling location [facility]?  No    Do you take an injectable or oral medication for weight loss or diabetes (excluding insulin)?  No    Do you take the medication Naltrexone?  No    Do you take blood thinners?  No       Prep   Are you currently on dialysis or do you have chronic kidney disease?  No    Do you have a diagnosis of diabetes?  No    Do you have a diagnosis of cystic fibrosis (CF)?  No    On a regular basis do you go 3 -5 days between bowel movements?  No    BMI > 40?  No    Preferred Pharmacy:    [id number] - [place] - [address] AT [facility] [place] [address] [facility] [address]  [address]  [place] [address]  Phone: [phone] Fax: [phone]      Final Scheduling Details     Procedure scheduled  Colonoscopy / Upper endoscopy (EGD)    Surgeon:  AMADO     Date of procedure:  01/15/2025     Pre-OP / PAC:   No - Not required for this site.    Location  [facility] - Patient preference.    Sedation   Moderate Sedation - Per order.      Patient Reminders:   You will receive a call from a Nurse to review instructions and health history.  This assessment must be completed prior to your procedure.  Failure to complete the Nurse assessment may result in the procedure being cancelled.      On the day of your procedure, please designate an adult(s) who can drive you home stay with you for the next 24 hours. The medicines used in the exam will make you sleepy. You will not be able to drive.      You cannot take public transportation, ride share services, or non-medical taxi service without a responsible caregiver.  Medical transport services are allowed with the requirement that a responsible caregiver will receive you at your destination.  We require that drivers and caregivers are confirmed prior to your procedure.

## 2024-11-26 ENCOUNTER — TRANSFERRED RECORDS (OUTPATIENT)
Dept: HEALTH INFORMATION MANAGEMENT | Facility: CLINIC | Age: 64
End: 2024-11-26
Payer: COMMERCIAL

## 2024-11-27 ENCOUNTER — VIRTUAL VISIT (OUTPATIENT)
Dept: FAMILY MEDICINE | Facility: CLINIC | Age: 64
End: 2024-11-27
Payer: COMMERCIAL

## 2024-11-27 DIAGNOSIS — K57.30 DIVERTICULOSIS OF LARGE INTESTINE WITHOUT HEMORRHAGE: Primary | ICD-10-CM

## 2024-11-27 DIAGNOSIS — B37.0 ORAL THRUSH: ICD-10-CM

## 2024-11-27 DIAGNOSIS — E78.00 PURE HYPERCHOLESTEROLEMIA: ICD-10-CM

## 2024-11-27 PROBLEM — K59.00 CONSTIPATION, UNSPECIFIED CONSTIPATION TYPE: Status: ACTIVE | Noted: 2024-11-27

## 2024-11-27 PROBLEM — R10.32 LEFT INGUINAL PAIN: Status: ACTIVE | Noted: 2024-11-27

## 2024-11-27 PROCEDURE — 99214 OFFICE O/P EST MOD 30 MIN: CPT | Mod: 95 | Performed by: INTERNAL MEDICINE

## 2024-11-27 PROCEDURE — G2211 COMPLEX E/M VISIT ADD ON: HCPCS | Mod: 95 | Performed by: INTERNAL MEDICINE

## 2024-11-27 RX ORDER — NYSTATIN 100000 [USP'U]/ML
500000 SUSPENSION ORAL 4 TIMES DAILY
Qty: 200 ML | Refills: 0 | Status: SHIPPED | OUTPATIENT
Start: 2024-11-27 | End: 2024-12-07

## 2024-11-27 RX ORDER — SIMVASTATIN 10 MG
10 TABLET ORAL AT BEDTIME
Qty: 90 TABLET | Refills: 1 | Status: SHIPPED | OUTPATIENT
Start: 2024-11-27

## 2024-11-27 NOTE — PROGRESS NOTES
Pretty is a 64 year old who is being evaluated via a billable video visit.    How would you like to obtain your AVS? MyChart  If the video visit is dropped, the invitation should be resent by: Text to cell phone: [phone]  Will anyone else be joining your video visit? No        Assessment and Plan  1. Diverticulosis of large intestine without hemorrhage (Primary)  Ongoing problem, uncontrolled which patient endorses that her current Abdominal pain is 2/10 severity as pt could take the antibiotic only for 3 days and stopped due to Nausea and tongue discoloration.  Denies any Nausea , Constipation or blood in stool at this time.   -Since patient has not been taking her antibiotic as prescribed due to the side effects she was slicing, shared decision to give a course of Augmentin which is only leftover antibiotic though she has penicillin allergy listed in the allergy list patient endorses that she tolerated Augmentin in the past and requesting me to remove the penicillin from allergy list which was placed remotely by her family.  All the risks and complications understood and she will update me if any new symptoms with this course.  -Emphasized to keep up the appointment with gastroenterology and the referral placed already in the past.  Patient understood and agreed the plan, ER precautions given.  - amoxicillin-clavulanate (AUGMENTIN) 875-125 MG tablet; Take 1 tablet by mouth 2 times daily.  Dispense: 20 tablet; Refill: 0    2. Oral thrush  New problem as patient endorses has been happening lately, differential diagnosis of oral thrush.  Sent in nystatin to patient pharmacy.  - nystatin (MYCOSTATIN) 628750 UNIT/ML suspension; Take 5 mLs (500,000 Units) by mouth 4 times daily for 10 days. Take 5 mL PO swish and swallow qid.  Dispense: 200 mL; Refill: 0    3. Pure hypercholesterolemia  Ongoing problem, uncontrolled.  Started on simvastatin recently but explained to the patient again on the questions she had  regarding this.  Patient understood and agreed the plan with the follow-up instructions given for dyslipidemia.  - simvastatin (ZOCOR) 10 MG tablet; Take 1 tablet (10 mg) by mouth at bedtime.  Dispense: 90 tablet; Refill: 1       The longitudinal plan of care for the diagnosis(es)/condition(s) as documented were addressed during this visit. Due to the added complexity in care, I will continue to support Pretty in the subsequent management and with ongoing continuity of care.      Please note that this note consists of symbols derived from keyboarding, dictation and/or voice recognition software. As a result, there may be errors in the script that have gone undetected. Please consider this when interpreting information found in this chart.    Patient Instructions   As discussed given your allergies to penicillin from event with levofloxacin and Flagyl but given the nausea and you have not taken more than 3 days we will need to complete a 10-day course of antibiotic for improvement.    As requested I went ahead and started back on penicillin/Augmentin, please let us know if you develop any reaction to it.  You will need to complete the 10 days course for making this better. Keep up the Appointment with gastroenterology as scheduled.     Also sent in oral thrush medication for your tongue problem which you are stating, also consider adding vitamin C and B12 supplements for improvement.      Return in about 3 months (around 2/27/2025), or if symptoms worsen or fail to improve, for If symptoms persist, dyslipidemia, video visit.    Caroline Paige MD  [facility]      Jeremi Olsen is a 64 year old, presenting for the following health issues:  Results (Ultrasound for hernia.) and Medication Reaction (Tongue problem. )        11/27/2024     7:36 AM   Additional Questions   Roomed by Laurie ZULETA     The patient has a few questions regarding her ultrasound results. She reports that she is not  experiencing the excruciating pain in her lower left quadrant that she had before. She also mentioned having an adverse reaction to one of her prescriptions, noting that her tongue turned black. However, she is unsure which medication caused this reaction. She does not have any swelling in her tongue but experienced vomiting.    History of Present Illness       Reason for visit:  Follow up on Ultrasound   She is taking medications regularly.     Last seen patient in November 13, 2024 for annual physical with acute concerns of diverticulitis symptoms most likely for which she was treated empirically given her past diverticulosis.  She is here with mild symptoms and follow-up today.       Allergies   Allergen Reactions    Aspirin Rash    Piroxicam Rash    Sulfa Antibiotics Rash        Past Medical History:   Diagnosis Date    Fatty liver disease, nonalcoholic     Impaired fasting glucose     Pure hypercholesterolemia        Past Surgical History:   Procedure Laterality Date    APPENDECTOMY      DISCECTOMY LUMBAR POSTERIOR MICROSCOPIC ONE LEVEL      L5-S1    ENDOSCOPIC RELEASE ULNAR NERVE (ELBOW) Bilateral     ESOPHAGOSCOPY, GASTROSCOPY, DUODENOSCOPY (EGD), COMBINED N/A 01/24/2019    Procedure: COMBINED ESOPHAGOSCOPY, GASTROSCOPY, DUODENOSCOPY (EGD), BIOPSY SINGLE OR MULTIPLE;  Surgeon: Manolo Solares MD;  Location:  GI    TONSILLECTOMY & ADENOIDECTOMY      TUBAL LIGATION         Family History   Problem Relation Age of Onset    Liver Disease Father     Myocardial Infarction Maternal Grandmother     Liver Disease Paternal Aunt     Diabetes Type 2  No family hx of     Cerebrovascular Disease No family hx of     Coronary Artery Disease Early Onset No family hx of     Breast Cancer No family hx of     Colon Cancer No family hx of     Ovarian Cancer No family hx of        Social History     Tobacco Use    Smoking status: Never     Passive exposure: Never    Smokeless tobacco: Never   Substance Use Topics    Alcohol use:  Yes     Alcohol/week: 14.0 standard drinks of alcohol     Types: 14 Standard drinks or equivalent per week        Current Outpatient Medications   Medication Sig Dispense Refill    amoxicillin-clavulanate (AUGMENTIN) 875-125 MG tablet Take 1 tablet by mouth 2 times daily. 20 tablet 0    docusate sodium (COLACE) 100 MG capsule Take 1 capsule (100 mg) by mouth 2 times daily. 60 capsule 1    nystatin (MYCOSTATIN) 701486 UNIT/ML suspension Take 5 mLs (500,000 Units) by mouth 4 times daily for 10 days. Take 5 mL PO swish and swallow qid. 200 mL 0    omeprazole (PRILOSEC) 20 MG DR capsule Take 1 capsule (20 mg) by mouth daily. 30 capsule 1    polyethylene glycol (MIRALAX) 17 GM/Dose powder Take 17 g (1 Capful) by mouth daily. 500 g 2    simvastatin (ZOCOR) 10 MG tablet Take 1 tablet (10 mg) by mouth at bedtime. 90 tablet 1    traZODone (DESYREL) 50 MG tablet Take 1 tablet (50 mg) by mouth nightly as needed for sleep. 30 tablet 1     No current facility-administered medications for this visit.          Review of Systems  Constitutional, HEENT, cardiovascular, pulmonary, GI, , musculoskeletal, neuro, skin, endocrine and psych systems are negative, except as otherwise noted.      Objective    Vitals - Patient Reported  Pain Score: No Pain (1)  Pain Loc: Abdomen        Physical Exam   GENERAL: alert and no distress  EYES: Eyes grossly normal to inspection.  No discharge or erythema, or obvious scleral/conjunctival abnormalities.  RESP: No audible wheeze, cough, or visible cyanosis.    SKIN: Visible skin clear. No significant rash, abnormal pigmentation or lesions.  NEURO: Cranial nerves grossly intact.  Mentation and speech appropriate for age.  PSYCH: Appropriate affect, tone, and pace of words      Video-Visit Details    Type of service:  Video Visit   Originating Location (pt. Location): Home    Distant Location (provider location):  On-site  Platform used for Video Visit: Ti  Signed Electronically by: Caroline  MD Grecia

## 2024-11-27 NOTE — PATIENT INSTRUCTIONS
As discussed given your allergies to penicillin from event with levofloxacin and Flagyl but given the nausea and you have not taken more than 3 days we will need to complete a 10-day course of antibiotic for improvement.    As requested I went ahead and started back on penicillin/Augmentin, please let us know if you develop any reaction to it.  You will need to complete the 10 days course for making this better. Keep up the Appointment with gastroenterology as scheduled.     Also sent in oral thrush medication for your tongue problem which you are stating, also consider adding vitamin C and B12 supplements for improvement.

## 2024-12-17 ENCOUNTER — TELEPHONE (OUTPATIENT)
Dept: GASTROENTEROLOGY | Facility: CLINIC | Age: 64
End: 2024-12-17
Payer: COMMERCIAL

## 2024-12-17 NOTE — TELEPHONE ENCOUNTER
Caller: Pretty    Reason for Reschedule/Cancellation   (please be detailed, any staff messages or encounters to note?): Pt's diverticulitis just resolved and they need to push appt out 6 weeks       Prior to reschedule please review:  Ordering Provider: Caroline Paige MD  Sedation Determined: CS  Does patient have any ASC Exclusions, please identify?: N      Notes on Cancelled Procedure:  Procedure: Upper and Lower Endoscopy [EGD and Colonoscopy]   Date: 01/15/2025  Location: [facility]; [address] [place] [address]   Surgeon: Timothy      Rescheduled: Yes,   Procedure: Lower Endoscopy [Colonoscopy]    Date: 02/10/2025   Location: [facility]; [address] [place] [address]    Surgeon: Bonnie   Sedation Level Scheduled  CS ,  Reason for Sedation Level Ordder   Instructions updated and sent: Y     Does patient need PAC or Pre -Op Rescheduled? : n       Did you cancel or rescheduled an EUS procedure? No.

## 2025-01-15 ENCOUNTER — IMMUNIZATION (OUTPATIENT)
Dept: FAMILY MEDICINE | Facility: CLINIC | Age: 65
End: 2025-01-15
Payer: COMMERCIAL

## 2025-01-15 DIAGNOSIS — Z23 ENCOUNTER FOR IMMUNIZATION: Primary | ICD-10-CM

## 2025-01-15 PROCEDURE — 90480 ADMN SARSCOV2 VAC 1/ONLY CMP: CPT

## 2025-01-15 PROCEDURE — 90471 IMMUNIZATION ADMIN: CPT

## 2025-01-15 PROCEDURE — 99207 PR NO CHARGE NURSE ONLY: CPT

## 2025-01-15 PROCEDURE — 90673 RIV3 VACCINE NO PRESERV IM: CPT

## 2025-01-15 PROCEDURE — 91320 SARSCV2 VAC 30MCG TRS-SUC IM: CPT

## 2025-01-15 NOTE — PROGRESS NOTES
Prior to immunization administration, verified patients identity using patient s name and date of birth. Please see Immunization Activity for additional information.     Is the patient's temperature normal (100.5 or less)? Yes     Patient MEETS CRITERIA. PROCEED with vaccine administration.          1/15/2025   COVID   Have you had myocarditis or pericarditis (inflammation of or around the heart muscle) after getting a COVID-19 vaccine? No   Have you had a serious reaction to a COVID vaccine or something in a COVID vaccine, like polyethylene glycol (PEG) or polysorbate? No   Have you had multisystem inflammatory syndrome from COVID-19 in the past 90 days? No   Have you received a bone marrow transplant within the previous 3 months? No         Patient MEETS CRITERIA. PROCEED with vaccine administration.            1/15/2025   INFLUENZA   Would you like to receive the flu shot or the nasal flu vaccine today? Flu Shot   Have you had a serious reaction to a flu vaccine or something in a flu vaccine? No   Have you had Guillain-[place] syndrome within 6 weeks of getting a vaccine? No   Have you received a bone marrow transplant within the previous 6 months? No         Patient MEETS CRITERIA. PROCEED with vaccine administration.        Patient instructed to remain in clinic for 15 minutes afterwards, and to report any adverse reactions.      Link to Ancillary Visit Immunization Standing Orders SmartSet     Screening performed by Riddhi Schwab MA on 1/15/2025 at 9:40 AM.

## 2025-02-05 DIAGNOSIS — K21.9 GASTROESOPHAGEAL REFLUX DISEASE, UNSPECIFIED WHETHER ESOPHAGITIS PRESENT: ICD-10-CM

## 2025-02-05 DIAGNOSIS — K44.9 HIATAL HERNIA: ICD-10-CM

## 2025-02-06 RX ORDER — OMEPRAZOLE 20 MG/1
20 CAPSULE, DELAYED RELEASE ORAL DAILY
Qty: 30 CAPSULE | Refills: 1 | Status: SHIPPED | OUTPATIENT
Start: 2025-02-06

## 2025-02-10 ENCOUNTER — HOSPITAL ENCOUNTER (OUTPATIENT)
Facility: CLINIC | Age: 65
Discharge: HOME | End: 2025-02-10
Attending: INTERNAL MEDICINE | Admitting: INTERNAL MEDICINE
Payer: COMMERCIAL

## 2025-02-10 VITALS
SYSTOLIC BLOOD PRESSURE: 134 MMHG | HEIGHT: 60 IN | WEIGHT: 146 LBS | RESPIRATION RATE: 17 BRPM | HEART RATE: 85 BPM | DIASTOLIC BLOOD PRESSURE: 91 MMHG | BODY MASS INDEX: 28.66 KG/M2 | OXYGEN SATURATION: 98 %

## 2025-02-10 LAB
COLONOSCOPY: NORMAL
UPPER GI ENDOSCOPY: NORMAL

## 2025-02-10 PROCEDURE — 88305 TISSUE EXAM BY PATHOLOGIST: CPT | Mod: TC | Performed by: INTERNAL MEDICINE

## 2025-02-10 PROCEDURE — 250N000009 HC RX 250: Performed by: INTERNAL MEDICINE

## 2025-02-10 PROCEDURE — 45385 COLONOSCOPY W/LESION REMOVAL: CPT | Performed by: INTERNAL MEDICINE

## 2025-02-10 PROCEDURE — 88305 TISSUE EXAM BY PATHOLOGIST: CPT | Mod: 26 | Performed by: PATHOLOGY

## 2025-02-10 PROCEDURE — 250N000011 HC RX IP 250 OP 636: Performed by: INTERNAL MEDICINE

## 2025-02-10 PROCEDURE — 43239 EGD BIOPSY SINGLE/MULTIPLE: CPT | Performed by: INTERNAL MEDICINE

## 2025-02-10 PROCEDURE — G0500 MOD SEDAT ENDO SERVICE >5YRS: HCPCS | Performed by: INTERNAL MEDICINE

## 2025-02-10 RX ORDER — FENTANYL CITRATE 50 UG/ML
INJECTION, SOLUTION INTRAMUSCULAR; INTRAVENOUS PRN
Status: DISCONTINUED | OUTPATIENT
Start: 2025-02-10 | End: 2025-02-10 | Stop reason: HOSPADM

## 2025-02-10 ASSESSMENT — ACTIVITIES OF DAILY LIVING (ADL)
ADLS_ACUITY_SCORE: 41
ADLS_ACUITY_SCORE: 41

## 2025-02-10 NOTE — PROGRESS NOTES
Vitals equipment was not associated with the patient and was deleted before it could be manually entered. VSS throughout entire recovery on RA. BP was in 110s systolic. Denies nausea/dizziness. NSR without ectopy. Instructions reviewed with patient. Left in stable condition.

## 2025-02-13 LAB
PATH REPORT.COMMENTS IMP SPEC: NORMAL
PATH REPORT.COMMENTS IMP SPEC: NORMAL
PATH REPORT.FINAL DX SPEC: NORMAL
PATH REPORT.GROSS SPEC: NORMAL
PATH REPORT.MICROSCOPIC SPEC OTHER STN: NORMAL
PATH REPORT.RELEVANT HX SPEC: NORMAL
PHOTO IMAGE: NORMAL

## 2025-03-10 DIAGNOSIS — G47.00 INSOMNIA, UNSPECIFIED TYPE: ICD-10-CM

## 2025-03-12 RX ORDER — TRAZODONE HYDROCHLORIDE 50 MG/1
50 TABLET ORAL
Qty: 30 TABLET | Refills: 1 | Status: SHIPPED | OUTPATIENT
Start: 2025-03-12

## 2025-05-31 ENCOUNTER — HEALTH MAINTENANCE LETTER (OUTPATIENT)
Age: 65
End: 2025-05-31

## (undated) RX ORDER — FENTANYL CITRATE 50 UG/ML
INJECTION, SOLUTION INTRAMUSCULAR; INTRAVENOUS
Status: DISPENSED
Start: 2025-02-10

## (undated) RX ORDER — FENTANYL CITRATE 50 UG/ML
INJECTION, SOLUTION INTRAMUSCULAR; INTRAVENOUS
Status: DISPENSED
Start: 2019-01-24